# Patient Record
Sex: FEMALE | Race: WHITE
[De-identification: names, ages, dates, MRNs, and addresses within clinical notes are randomized per-mention and may not be internally consistent; named-entity substitution may affect disease eponyms.]

---

## 2017-07-19 ENCOUNTER — HOSPITAL ENCOUNTER (INPATIENT)
Dept: HOSPITAL 36 - ER | Age: 47
LOS: 7 days | Discharge: HOME | DRG: 301 | End: 2017-07-26
Attending: INTERNAL MEDICINE | Admitting: INTERNAL MEDICINE
Payer: MEDICAID

## 2017-07-19 DIAGNOSIS — Z82.49: ICD-10-CM

## 2017-07-19 DIAGNOSIS — F17.210: ICD-10-CM

## 2017-07-19 DIAGNOSIS — N39.0: ICD-10-CM

## 2017-07-19 DIAGNOSIS — G62.9: ICD-10-CM

## 2017-07-19 DIAGNOSIS — S72.142A: Primary | ICD-10-CM

## 2017-07-19 DIAGNOSIS — B19.20: ICD-10-CM

## 2017-07-19 DIAGNOSIS — J44.9: ICD-10-CM

## 2017-07-19 DIAGNOSIS — D72.829: ICD-10-CM

## 2017-07-19 DIAGNOSIS — D64.9: ICD-10-CM

## 2017-07-19 DIAGNOSIS — F31.9: ICD-10-CM

## 2017-07-19 DIAGNOSIS — E44.1: ICD-10-CM

## 2017-07-19 LAB
AMORPH SED URNS QL MICRO: (no result)
AMPHET UR-MCNC: NEGATIVE NG/ML
ANION GAP SERPL CALC-SCNC: 8.5 MMOL/L (ref 7–16)
BACTERIA #/AREA URNS HPF: (no result) /HPF
BARBITURATES UR-MCNC: NEGATIVE UG/ML
BASOPHILS NFR BLD AUTO: 1.1 % (ref 0–2)
BILIRUB UR-MCNC: (no result) MG/DL
BNP SERPL-MCNC: 9.8 PG/ML (ref 5–100)
BUN SERPL-MCNC: 12 MG/DL (ref 7–25)
BUN/CREAT SERPL: 10.9
CALCIUM SERPL-MCNC: 10.4 MG/DL (ref 8.6–10.3)
CHLORIDE SERPL-SCNC: 105 MEQ/L (ref 98–107)
CHOLEST SERPL-MCNC: 161 MG/DL (ref ?–200)
CO2 SERPL-SCNC: 24.8 MEQ/L (ref 21–31)
COLOR UR: (no result)
CREAT SERPL-MCNC: 1.1 MG/DL (ref 0.6–1.2)
EOSINOPHIL NFR BLD AUTO: 0.3 % (ref 0–5)
EPI CELLS URNS QL MICRO: (no result) /LPF
ERYTHROCYTE [DISTWIDTH] IN BLOOD BY AUTOMATED COUNT: 13.1 % (ref 11.5–20)
GLUCOSE SERPL-MCNC: 117 MG/DL (ref 70–105)
GLUCOSE UR STRIP-MCNC: NEGATIVE MG/DL
HCT VFR BLD CALC: 39.5 % (ref 35–45)
HGB BLD-MCNC: 13.4 GM/DL (ref 11.7–15.5)
INR PPP: 0.91 (ref 0.5–1.4)
KETONES UR STRIP-MCNC: NEGATIVE MG/DL
LYMPHOCYTES NFR BLD AUTO: 12.5 % (ref 20–50)
MCH RBC QN AUTO: 29.1 PG (ref 27–31)
MCHC RBC AUTO-ENTMCNC: 33.8 PG (ref 28–36)
MCV RBC AUTO: 86 FL (ref 81–100)
METHADONE UR CFM-MCNC: POSITIVE NG/ML
MONOCYTES NFR BLD AUTO: 6.9 % (ref 2–10)
NEUTROPHILS # BLD: 10.8 TH/CMM (ref 1.8–8)
NEUTROPHILS NFR BLD AUTO: 79.2 % (ref 40–80)
PH UR STRIP: >=9 [PH]
PLATELET # BLD: 262 TH/CMM (ref 150–400)
PMV BLD AUTO: 8.1 FL
POTASSIUM SERPL-SCNC: 4.3 MEQ/L (ref 3.5–5.1)
PROT UR STRIP-MCNC: >300 MG/DL
PROTHROMBIN TIME: 9.5 SECONDS (ref 9.5–11.5)
RBC # BLD AUTO: 4.59 MIL/CMM (ref 3.8–5.1)
RBC # UR STRIP: NEGATIVE /UL
RBC #/AREA URNS HPF: (no result) /HPF (ref 0–5)
SODIUM SERPL-SCNC: 134 MEQ/L (ref 136–145)
TRI-PHOS CRY #/AREA URNS HPF: (no result) /HPF
TRIGL SERPL-MCNC: 166 MG/DL (ref ?–150)
TROPONIN I SERPL-MCNC: < 0.01 NG/ML (ref 0.01–0.05)
UROBILINOGEN UR STRIP-ACNC: 4 E.U./DL (ref 0.2–1)
WBC # BLD AUTO: 13.5 TH/CMM (ref 4.8–10.8)
WBC #/AREA URNS HPF: (no result) /HPF (ref 0–5)

## 2017-07-19 PROCEDURE — X6206: HCPCS

## 2017-07-19 PROCEDURE — Z7610: HCPCS

## 2017-07-19 PROCEDURE — A4216 STERILE WATER/SALINE, 10 ML: HCPCS

## 2017-07-19 PROCEDURE — X3904: HCPCS

## 2017-07-19 RX ADMIN — MORPHINE SULFATE PRN MG: 2 INJECTION, SOLUTION INTRAMUSCULAR; INTRAVENOUS at 21:30

## 2017-07-19 NOTE — DIAGNOSTIC IMAGING REPORT
Exam: Left hip joint.



: HISTORY: Status post fall



Findings:



Portable examination of the left hip joint at 1434 hours was reviewed.



The study demonstrates a intratrochanteric fracture of the left femur

with mild overriding of the shaft of left femur. The head of left femur

is well left acetabulum. The visualized hemipelvis is intact.



IMPRESSION: Minimally displaced intratrochanteric fracture left hip

joint

## 2017-07-19 NOTE — DIAGNOSTIC IMAGING REPORT
Exam: Portable examination of pelvis.



HISTORY: Status post fall



Findings:



Portable examination of the pelvis of the 14th 34 hours was reviewed,

the study demonstrates minimally displaced intratrochanteric fracture of

the left hip joint. The head of left femur is well within the acetabular

fossa. The pelvis is intact. The right hip joint is normal.



IMPRESSION:



Minimally displaced intratrochanteric fracture left hip joint.

## 2017-07-19 NOTE — ED PHYSICIAN CHART
Chief Complaint/HPI





- Patient Information


Date Seen:: 17


Time Seen:: 13:15


Chief Complaint:: Left Hip Pain


History of Present Illness:: 





onset x 4 days of intermittent left hip pain; pt denies any trauma; last 

tetanus shot: <5 years; UTD; pt denies LOC, ALOC, AMS, H/As, neck pain, C/P, SOB

, Abd. pain, cough, A/N/V/D/c, fever, chills, or urinary s/s


Allergies:: 


 Allergies











Allergy/AdvReac Type Severity Reaction Status Date / Time


 


No Known Allergies Allergy   Verified 17 13:12











Vitals:: 


 Vital Signs - 8 hr











  17





  13:13


 


Temp 98.9 F


 





 


RR 16


 


/81


 


O2 Sat % 97











Historian:: Patient, Family Member


Review:: Nurse's Note Reviewed





Review of Systems





- Review of Systems


General/Constitutional: Fever, Chills, No weight loss, No weakness, No 

diaphoresis, No edema, No loss of appetite


Skin: No skin lesions, Rash, No bruising


Head: No headache, No light-headedness


Eyes: No loss of vision, No pain, No diplopia


ENT: No earache, No nasal drainage, No sore throat, No tinnitus


Neck: No neck pain, No swelling, No thyromegaly, No stiffness, No mass noted


Cardio Vascular: No chest pain, No palpitations, No PND, No orthopnea, No edema


Pulmonary: No SOB, No cough, No sputum, No wheezing


GI: Nausea, Vomiting, Diarrhea, No pain, No melena, No hematochezia, 

Constipation, No hematemesis


G/U: No dysuria, No frequency, No hematuria


Ob/Gyn: No vaginal discharge, No abnormal vaginal bleed, No contraction


Musculoskeletal: Bone or joint pain, No back pain, No muscle pain


Endocrine: No polyuria, No polydipsia


Psychiatric: Prior psych history, Depression, Anxiety, No suicidal ideation, No 

homicidal ideation, No auditory hallucination, No visual hallucination


Hematopoietic: No bruising, No lymphadenopathy


Allergic/Immuno: No urticaria, No angioedema


Neurological: No syncope, No focal symptoms, No weakness, No paresthesia, No 

headache, No seizure, No dizziness, No confusion, No vertigo





Past Medical History





- Past Medical History


Obtainable: Yes


Past Medical History: Asthma/COPD, Other (PNA)


Family History: HTN


Social History: Smoker, No Alcohol, No Drug Use, Single


Surgical History: 


Psychiatricy History: Bipolar


Medication: Reviewed





Family Medical History





- Family Member


  ** Mother


Hx Family Cancer: No


Hx Family Congestive Heart Failure: No


Hx Family Hypertension: No


Hx Family Diabetes: No


Hx Family Seizures: No


Hx Family Dementia: No


Hx Family AIDS: No





Physical Exam





- Physical Examination


General/Constitutional: Awake, Well-developed, well-nourished, Alert, No 

distress, GCS 15, Non-toxic appearing, Ambulatory


Head: Atraumatic


Eyes: Lids, conjuctiva normal, PERRL, EOMI


Skin: Nl inspection, No rash, No skin lesions, No ecchymosis, Well hydrated, No 

lymphadenopathy


ENMT: External ears, nose nl, Nasal exam nl, Lips, teeth, gums nl


Neck: Nontender, Full ROM w/o pain, No JVD, No nuchal rigidity, No bruit, No 

mass, No stridor


Respiratory: Nl effort/Exclusion, Clear to Auscultation, No Wheeze/Rhonchi/Rales


Cardio Vascular: RRR, No murmur, gallop, rubs, NL S1 S2


GI: No tenderness/rebounding/guarding, No organomegaly, No hernia, Normal BS's, 

Nondistended, No mass/bruits, No McBurney tenderness


: No CVA tenderness


Extremities: normal strength in all extremities, No edema, Normal digits & nails


Other Extremities comments:: 





Left Hip: + tenderness and deformity with limited ROMs; good NV functions


Neuro/Psych: Alert/oriented, DTR's symmetric, Normal sensory exam, Normal motor 

strength, Judgement/insight normal, Mood normal, Normal gait, No focal deficits


Misc: normal gait, Normal back, No paraspinal tenderness





Labs/Radiology/EKG Results





- Lab Results


Comments:: 





U/A: + Nitrate; + Leukocytes; + Bacteria





- Radiology Results


Results: 





+ Mildly displaced Intertrochanteric Fracture of Left Hip





ED Septic Shock





- .


Is Septic Shock (SBP<90, OR Lactate>4 mmol\L) present?: No





- <6hrs of presentation:


Vital Signs: 


 Vital Signs - 8 hr











  17





  13:13


 


Temp 98.9 F


 





 


RR 16


 


/81


 


O2 Sat % 97














Reassessment (Disposition)





- Reassessment


Reassessment Condition:: Improved





- Diagnosis


Diagnosis:: 





Left Hip Fracture; Left Hip Intractable Pain; UTI





- Aftercare/Follow up Instructions


Aftercare/Follow-Up Instructions:: Counseled pt regarding lab results/diagnosis 

& need follow up, Counseled pt & family regarding lab results/diagnosis & need 

follow up





- Patient Disposition


Discharge/Transfer:: Acute Care w/in this hosp


Accepting Physician:: Dr. Ruth


Time Called:: 7640


Time Responded:: 16:40


Admitted to:: Med/Surg


Spoke to:: Dr. Ruth


Admitting Medical Physician:: Dr. Ruth


Condition at Disposition:: Stable, Improved

## 2017-07-20 VITALS — SYSTOLIC BLOOD PRESSURE: 138 MMHG | DIASTOLIC BLOOD PRESSURE: 71 MMHG

## 2017-07-20 LAB
ANION GAP SERPL CALC-SCNC: 7.8 MMOL/L (ref 7–16)
BASOPHILS NFR BLD AUTO: 1.3 % (ref 0–2)
BUN SERPL-MCNC: 15 MG/DL (ref 7–25)
BUN/CREAT SERPL: 16.7
CALCIUM SERPL-MCNC: 9.7 MG/DL (ref 8.6–10.3)
CHLORIDE SERPL-SCNC: 107 MEQ/L (ref 98–107)
CO2 SERPL-SCNC: 23 MEQ/L (ref 21–31)
CREAT SERPL-MCNC: 0.9 MG/DL (ref 0.6–1.2)
EOSINOPHIL NFR BLD AUTO: 0.3 % (ref 0–5)
ERYTHROCYTE [DISTWIDTH] IN BLOOD BY AUTOMATED COUNT: 13.4 % (ref 11.5–20)
GLUCOSE SERPL-MCNC: 180 MG/DL (ref 70–105)
HCT VFR BLD CALC: 36.3 % (ref 35–45)
HGB BLD-MCNC: 12.2 GM/DL (ref 11.7–15.5)
HGB BLD-MCNC: 13 G/DL (ref 4–35)
INR PPP: 0.9 (ref 0.5–1.4)
LYMPHOCYTES NFR BLD AUTO: 19.5 % (ref 20–50)
MAGNESIUM SERPL-MCNC: 2.1 MG/DL (ref 1.9–2.7)
MCH RBC QN AUTO: 29.2 PG (ref 27–31)
MCHC RBC AUTO-ENTMCNC: 33.7 PG (ref 28–36)
MCV RBC AUTO: 86.7 FL (ref 81–100)
MONOCYTES NFR BLD AUTO: 7.4 % (ref 2–10)
NEUTROPHILS # BLD: 7.2 TH/CMM (ref 1.8–8)
NEUTROPHILS NFR BLD AUTO: 71.5 % (ref 40–80)
PLATELET # BLD: 243 TH/CMM (ref 150–400)
PMV BLD AUTO: 8.5 FL
POTASSIUM SERPL-SCNC: 3.8 MEQ/L (ref 3.5–5.1)
PROTHROMBIN TIME: 9.3 SECONDS (ref 9.5–11.5)
RBC # BLD AUTO: 4.19 MIL/CMM (ref 3.8–5.1)
SODIUM SERPL-SCNC: 134 MEQ/L (ref 136–145)
WBC # BLD AUTO: 9.9 TH/CMM (ref 4.8–10.8)

## 2017-07-20 RX ADMIN — MORPHINE SULFATE PRN MG: 2 INJECTION, SOLUTION INTRAMUSCULAR; INTRAVENOUS at 06:50

## 2017-07-20 RX ADMIN — ENOXAPARIN SODIUM SCH MG: 100 INJECTION SUBCUTANEOUS at 17:16

## 2017-07-20 RX ADMIN — MORPHINE SULFATE PRN MG: 2 INJECTION, SOLUTION INTRAMUSCULAR; INTRAVENOUS at 01:40

## 2017-07-20 RX ADMIN — MORPHINE SULFATE PRN MG: 2 INJECTION, SOLUTION INTRAMUSCULAR; INTRAVENOUS at 18:47

## 2017-07-20 RX ADMIN — ENOXAPARIN SODIUM SCH MG: 100 INJECTION SUBCUTANEOUS at 01:21

## 2017-07-20 RX ADMIN — ENOXAPARIN SODIUM SCH MG: 100 INJECTION SUBCUTANEOUS at 09:00

## 2017-07-20 RX ADMIN — FLUCONAZOLE SCH MLS/HR: 2 INJECTION, SOLUTION INTRAVENOUS at 12:24

## 2017-07-20 RX ADMIN — MORPHINE SULFATE PRN MG: 2 INJECTION, SOLUTION INTRAMUSCULAR; INTRAVENOUS at 13:14

## 2017-07-20 NOTE — ADMIT CRITERIA FORM
Admit Criteria Forms





- Admit Criteria


Diagnosis: 








               MUSCULOSKELETAL DISEASE [facility]





Clinical Indications for Admission to Inpatient Care





                                                            (Place 'X' for any 

and all applicable criteria):





Hospital admission is needed for appropriate care of the patient because of 1 

or more of the following:





[X ]I.    Fracture, dislocation, or other musculoskeletal injury requiring 

inpatient care(medical) as indicated 


         by 1 or more of the following(4)(5)(6)(7)


         [ ]a)  Vertebral fracture requiring observation for instability or 

neurologic compromise (8)


         [ ]b)  Compartment syndrome (proven or cannot be ruled out during 

observation level of care) (9)


         [ ]c)  Limb-threatening injury


         [ ]d)  Major injury requiring inpatient stabilization such as traction 

initiation or external fixation 


                 before internal fixation or closure of complex or open fracture


         [ X]e)  Major injury requiring inpatient treatment after emergency or 

observation level care (as appropriate)


         [X ]f)   Severe pain requiring acute inpatient management


         [ ]g)  Injury with suspicion of abuse or neglect (eg., child, 

dependent elderly)





[ ]II.    Newly diagnosed or suspected bone, joint, or orthopedic device 

infection (e.g., osteomyelitis, septic arthritis) 


          needing 1 or more of the following(1)(2)(3)


          [ ]a)   IV antibiotics that cannot be initiated in other than 

inpatient setting (e.g., patient too unstable or


                   home infusion not available)


          [ ]b)   Device removal or replacement


          [ ]c)   Bone or soft tissue debridement


          [ ]d)   Joint drainage (drain placement or repetitive aspirations)


[ ]III.    Severe rheumatologic disease (e.g., systemic lupus erythematosus, 

rheumatoid arthritis) with complications


          or comorbidities (Also use Optimal Recovery Care Criteria or General 

Recovery Criteria as appropriate on the 


          basis of predominant condition), including 1 or more of the following(

10)(11)(12)(13)


          [ ]a)  Severe infection (e.g., CNS infection, sepsis) (14)


          [ ]b)  Respiratory complications, including 1 or more of the following

:


                  [ ]i)     Pleural effusion with respiratory compromise      


                  [ ]ii)    Pulmonary hypertension with congestive failure 


                  [ ]iii)   Respiratory failure


                  [ ]iv)   Pulmonary hemorrhage (15)


           [ ]c) Hematologic disease, including 1 or more of the following:


                 [ ]i)     Coagulopathy with bleeding        


                 [ ]ii)    Thrombosis with hypercoagulable state      


                 [ ]iii)   Thrombotic thrombocytopenic purpura 


           [ ]d) Cerebritis with seizures, psychosis, or other severe 

abnormalities


           [ ]e) Vertebral destruction with monitoring needed for cervical 

myelopathy& possible respiratory compromise


           [ ]f)  Exacerbation that requires inpatient treatment (e.g., 

intravenous immunosuppression) (16)


           [ ]g) Acute renal failure


           [ ]h) Cerebritis with seizures, psychosis, Altered mental status, or 

other neurologic abnormalities


           [ ]i) Pericardial effusion with tamponade


           [ ]j) Vertebral destruction, with monitoring needed for cervical 

myelopathy and possible respiratory compromise





[ ]IV. Severe vasculitis with complications or comorbidities (Also use Optimal 

Recovery Care Criteria 


        General Recovery Criteria as appropriate on the basis of predominant 

condition), including


        1 or more of the following(11)(12)(17)(18)(19)(20)


        [ ]a)   Exacerbation that requires inpatient treatment (e.g., 

intravenous immunosuppression) (19)(21)


        [ ]b)    Pulmonary hemorrhage (15)                                     

                                  


        [ ]c)   CNS vasculitis with seizures, psychosis, Altered mental status 

that is severe or persistent, or other severe abnormalities (22)


        [ ]d)  Cerebral infarction                                             


        [ ]e)  Gastrointestinal ischemia 


        [ ]f)   Gangrene or threatened amputation


        [ ]g)  Renal failure (16)   


        [ ]h) Other significant complications of vasculitis ( eg., tissue or 

organ ischemia, organ dysfunction )





[ ]V.  Severe myopathy as indicated by 1 or more of the following (28)(29)


        [ ]a)  New onset of airway compromise or inability to swallow


        [ ]b)  Respiratory deterioration with observation needed for impending 

respiratory failure


        [ ]c)  Exacerbation that requires inpatient treatment (e.g., 

intravenous immunosuppression) 





[ ]VI. Severe crystal gout (arthropathy) indicated by 1 or more of the 

following (23)(24)


        [ ]a)  Severe pain requiring acute inpatient management


        [ ]b)  Exacerbation that requires inpatient treatment (e.g., 

intravenous treatment) 





[ ]VII.Rhabdomyolysis and 1 or more of the following (25)(26)(27)


        [ ]a)  Acute renal failure


        [ ]b)  Need for intravenous hydration after emergency or observation 

level care (as appropriate)


        [ ]c)  Inability to maintain oral hydration


        [ ]d)  Change in mental status


        [ ]e)  Electrolyte abnormality that remains after emergency or 

observation level care (as appropriate) 





[ ]VIII Post amputation complication, as indicated by ANY ONE of the following 


         [ ]a) Infection


         [ ]b) Dehiscence


         [ ]c) Myodesis failure        





[ ]IX. Severe pain requiring acute inpatient management due to musculoskeletal 

condition


       


[ ]X.  Musculoskeletal Disease and ALL of the following:


        [ ]a)  Symptom or finding for which emergency and observation care have 

failed or are not considered 


                appropriate (Use General Criteria: Observation Care as 

appropriate)


        [ ]b)  Presence of ANY ONE of the following


               [ ]i)   A General Admission Criteria    


               [ ]ii)  A Pediatric General Admission Criteria 











The original [facility] content created by [facility] has been revised. 


The portions of the content which have been revised are identified through the 

use of italic text or in bold, and [facility] 


has neither reviewed nor approved the modified material. All other unmodified 

content is copyright  [facility].





Please see references footnoted in the original [facility] edition 

2016


Admit Criteria Met?: Yes

## 2017-07-20 NOTE — DIAGNOSTIC IMAGING REPORT
Portable chest x-ray

Time: 1633 hours



History: None



Allowing for portable technique the heart size is normal. No focal

pulmonary parenchymal processes. No hilar or mediastinal abnormalities.



Impression: No acute abnormalities.

## 2017-07-21 LAB
ANION GAP SERPL CALC-SCNC: 7.2 MMOL/L (ref 7–16)
BUN SERPL-MCNC: 7 MG/DL (ref 7–25)
BUN/CREAT SERPL: 10
CALCIUM SERPL-MCNC: 9.3 MG/DL (ref 8.6–10.3)
CHLORIDE SERPL-SCNC: 111 MEQ/L (ref 98–107)
CO2 SERPL-SCNC: 23.8 MEQ/L (ref 21–31)
CREAT SERPL-MCNC: 0.7 MG/DL (ref 0.6–1.2)
EOSINOPHIL NFR BLD: 2 % (ref 0–5)
ERYTHROCYTE [DISTWIDTH] IN BLOOD BY AUTOMATED COUNT: 13.5 % (ref 11.5–20)
GLUCOSE SERPL-MCNC: 104 MG/DL (ref 70–105)
HCT VFR BLD CALC: 37 % (ref 35–45)
HGB BLD-MCNC: 12.6 GM/DL (ref 11.7–15.5)
MAGNESIUM SERPL-MCNC: 2.1 MG/DL (ref 1.9–2.7)
MCH RBC QN AUTO: 29.2 PG (ref 27–31)
MCHC RBC AUTO-ENTMCNC: 34 PG (ref 28–36)
MCV RBC AUTO: 86 FL (ref 81–100)
NEUTROPHILS NFR BLD AUTO: 66 % (ref 40–80)
PLAT MORPH BLD: NORMAL
PLATELET # BLD EST: ADEQUATE 10*3/UL
PLATELET # BLD: 230 TH/CMM (ref 150–400)
PMV BLD AUTO: 8.1 FL
POTASSIUM SERPL-SCNC: 4 MEQ/L (ref 3.5–5.1)
RBC # BLD AUTO: 4.3 MIL/CMM (ref 3.8–5.1)
RBC MORPH BLD: NORMAL
SODIUM SERPL-SCNC: 138 MEQ/L (ref 136–145)
TOTAL CELLS COUNTED BLD: 100
WBC # BLD AUTO: 10.5 TH/CMM (ref 4.8–10.8)

## 2017-07-21 PROCEDURE — 0SRS0JZ REPLACEMENT OF LEFT HIP JOINT, FEMORAL SURFACE WITH SYNTHETIC SUBSTITUTE, OPEN APPROACH: ICD-10-PCS

## 2017-07-21 RX ADMIN — MORPHINE SULFATE PRN MG: 2 INJECTION, SOLUTION INTRAMUSCULAR; INTRAVENOUS at 10:30

## 2017-07-21 RX ADMIN — MORPHINE SULFATE PRN MG: 2 INJECTION, SOLUTION INTRAMUSCULAR; INTRAVENOUS at 06:13

## 2017-07-21 RX ADMIN — ENOXAPARIN SODIUM SCH MG: 100 INJECTION SUBCUTANEOUS at 09:15

## 2017-07-21 RX ADMIN — FLUCONAZOLE SCH MLS/HR: 2 INJECTION, SOLUTION INTRAVENOUS at 09:16

## 2017-07-21 RX ADMIN — ENOXAPARIN SODIUM SCH: 100 INJECTION SUBCUTANEOUS at 21:11

## 2017-07-21 NOTE — CONSULTATION
DATE OF CONSULTATION:  07/20/2017



HISTORY:  The patient is a 47-year-old lady admitted to [facility] on 07/19/2017 because of a fracture of the left hip.  I was called in

orthopedic consultation by the admitting physician regarding this fracture.



PAST HISTORY:  The patient is vociferous and cussing and not making any sense at

all.  I tried to take a history and find out about her fracture and I got

absolutely nowhere.



Information obtained from the chart indicates that she is a tobacco user.  She

was admitted from her home rather than a facility of any sort.  There is a

history of substance abuse.  She also has a history of asthma, COPD, and bipolar

disorder.



PHYSICAL EXAMINATION:  The patient was examined in her hospital room at [facility].  She was lying on her back on the bed with her left leg

hanging over the side.  I observed some swelling about the left hip, but no

bruising.  Any attempt at movement, active or passive brought about even louder

yelling and cussing, i.e. apparently caused her pain.  Her ankles "dry" with

no edema.  Peripheral pulses are thready.  Skin color and temperature is normal.

 I observed appropriate movement of all other limbs.



She was totally uncooperative when I was trying to examine her, and just

continued to cuss and yell even more loudly.



IMAGING STUDIES:  I did x-rays in the PACS _____ fracture in the femoral neck,

which is displaced.



ORTHOPEDIC DIAGNOSIS:  Fracture left femoral neck, closed, displaced.



RECOMMENDATIONS:  When the patient is medically cleared and optimized she can

have surgery _____ arthroplasty left hip, following which she can be ambulated,

partial weightbearing, and when stable and safe in a couple of days will be

transferred to a SNF.



Thank you for this interesting referral.





DD: 07/20/2017 23:12

DT: 07/21/2017 02:29

JOB# [id number]  [id number]

## 2017-07-21 NOTE — CARDIOLOGY
Cardiology Report





- Cardiology


Cardiology: 


Date of Service: 07/20/2017 Dr. Ruth








M-MODE ECHOCARDIOLGRAM: Mitral valve normal left ventricular posterior wall 

hypertrophy ejection fraction 60% left antrum normal aortic root is normal 

aortic leaflets normal








CONCLUSION: Hypertrophy left ventricle ejection fraction 60%








2D ECHO:


Long axis hypertrophy left ventricle mitral normal left atrium normal aortic 

root normal aortic leaflets normal


Short axis mitral valve and aortic valve normal


Apical 4 chamber normal-sized left ventricle with hypertrophy of the left 

ventricle ejection fraction 60% left Atrium normal right ventricular cavity 

domal right atrium normal





CONCLUSION:


Hypertrophy left ventricle ejection fraction 60%





DOPPLER: Prominent A wave normal antegrade flow mitral valve tricuspid valve 

the aortic  pulmonary valve








CONCLUSION:


07/20/2017

## 2017-07-22 LAB
ALBUMIN/GLOB SERPL: 1 {RATIO} (ref 1–1.8)
ALP SERPL-CCNC: 89 U/L (ref 34–104)
ALT SERPL-CCNC: 19 U/L (ref 7–52)
ANION GAP SERPL CALC-SCNC: 7.7 MMOL/L (ref 7–16)
AST SERPL-CCNC: 28 U/L (ref 13–39)
BASOPHILS NFR BLD AUTO: 0.4 % (ref 0–2)
BILIRUB SERPL-MCNC: 0.2 MG/DL (ref 0.3–1)
BUN SERPL-MCNC: 4 MG/DL (ref 7–25)
BUN/CREAT SERPL: 5.7
CALCIUM SERPL-MCNC: 8.5 MG/DL (ref 8.6–10.3)
CHLORIDE SERPL-SCNC: 109 MEQ/L (ref 98–107)
CO2 SERPL-SCNC: 21.7 MEQ/L (ref 21–31)
CREAT SERPL-MCNC: 0.7 MG/DL (ref 0.6–1.2)
EOSINOPHIL NFR BLD AUTO: 0.4 % (ref 0–5)
ERYTHROCYTE [DISTWIDTH] IN BLOOD BY AUTOMATED COUNT: 13.3 % (ref 11.5–20)
GLOBULIN SER-MCNC: 2.9 GM/DL
GLUCOSE SERPL-MCNC: 205 MG/DL (ref 70–105)
HCT VFR BLD CALC: 27.6 % (ref 35–45)
HGB BLD-MCNC: 9.4 GM/DL (ref 11.7–15.5)
LYMPHOCYTES NFR BLD AUTO: 15 % (ref 20–50)
MCH RBC QN AUTO: 29.5 PG (ref 27–31)
MCHC RBC AUTO-ENTMCNC: 34 PG (ref 28–36)
MCV RBC AUTO: 86.8 FL (ref 81–100)
MONOCYTES NFR BLD AUTO: 9.4 % (ref 2–10)
NEUTROPHILS # BLD: 8.8 TH/CMM (ref 1.8–8)
NEUTROPHILS NFR BLD AUTO: 74.8 % (ref 40–80)
PLATELET # BLD: 215 TH/CMM (ref 150–400)
PMV BLD AUTO: 8.2 FL
POTASSIUM SERPL-SCNC: 3.4 MEQ/L (ref 3.5–5.1)
RBC # BLD AUTO: 3.18 MIL/CMM (ref 3.8–5.1)
SODIUM SERPL-SCNC: 135 MEQ/L (ref 136–145)
WBC # BLD AUTO: 11.7 TH/CMM (ref 4.8–10.8)

## 2017-07-22 RX ADMIN — MORPHINE SULFATE PRN MG: 2 INJECTION, SOLUTION INTRAMUSCULAR; INTRAVENOUS at 18:25

## 2017-07-22 RX ADMIN — DEXTROSE AND SODIUM CHLORIDE SCH MLS/HR: 5; .45 INJECTION, SOLUTION INTRAVENOUS at 04:24

## 2017-07-22 RX ADMIN — MORPHINE SULFATE PRN MG: 2 INJECTION, SOLUTION INTRAMUSCULAR; INTRAVENOUS at 01:02

## 2017-07-22 RX ADMIN — NICOTINE SCH: 21 PATCH TRANSDERMAL at 01:01

## 2017-07-22 RX ADMIN — FLUCONAZOLE SCH MLS/HR: 2 INJECTION, SOLUTION INTRAVENOUS at 08:54

## 2017-07-22 RX ADMIN — ENOXAPARIN SODIUM SCH: 100 INJECTION SUBCUTANEOUS at 18:15

## 2017-07-22 RX ADMIN — DEXTROSE AND SODIUM CHLORIDE SCH MLS/HR: 5; .45 INJECTION, SOLUTION INTRAVENOUS at 22:06

## 2017-07-22 RX ADMIN — ENOXAPARIN SODIUM SCH: 100 INJECTION SUBCUTANEOUS at 08:51

## 2017-07-22 RX ADMIN — NICOTINE SCH: 21 PATCH TRANSDERMAL at 08:52

## 2017-07-22 NOTE — GENERAL PROGRESS NOTE
Subjective





- Review of Systems


Service Date: 17


Subjective: 





Modest pain left  hip - surgery yesterday  She goes outside and smokes.





Objective





- Results


Result Diagrams: 


 17 08:50





 17 08:50


Recent Labs: 


 Laboratory Last Values











WBC  11.7 Th/cmm (4.8-10.8)  H  17  08:50    


 


RBC  3.18 Mil/cmm (3.80-5.10)  L  17  08:50    


 


Hgb  9.4 gm/dL (11.7-15.5)  L D 17  08:50    


 


Hct  27.6 % (35.0-45.0)  L D 17  08:50    


 


MCV  86.8 fl ()   17  08:50    


 


MCH  29.5 pg (27.0-31.0)   17  08:50    


 


MCHC Differential  34.0 pg (28.0-36.0)   17  08:50    


 


RDW  13.3 % (11.5-20.0)   17  08:50    


 


Plt Count  215 Th/cmm (150-400)   17  08:50    


 


MPV  8.2 fl  17  08:50    


 


Neutrophils %  74.8 % (40.0-80.0)   17  08:50    


 


Lymphocytes %  15.0 % (20.0-50.0)  L  17  08:50    


 


Monocytes %  9.4 % (2.0-10.0)   17  08:50    


 


Eosinophils %  0.4 % (0.0-5.0)   17  08:50    


 


Basophils %  0.4 % (0.0-2.0)   17  08:50    


 


Neutrophils (Manual)  66 % (40-80)   17  10:59    


 


Lymphocytes  26 % (20-50)   17  10:59    


 


Monocytes  6 % (2-10)   17  10:59    


 


Eosinophils  2 % (0-5)   17  10:59    


 


Platelet Estimate  ADEQUATE  (NORMAL)   17  10:59    


 


Platelet Morphology  NORMAL  (NORMAL)   17  10:59    


 


RBC Morph Micro Appear  NORMAL  (NORMAL)   17  10:59    


 


PT  9.3 SECONDS (9.5-11.5)  L  17  05:57    


 


INR  0.90  (0.5-1.4)   17  05:57    


 


Sodium  135 mEq/L (136-145)  L  17  08:50    


 


Potassium  3.4 mEq/L (3.5-5.1)  L  17  08:50    


 


Chloride  109 mEq/L ()  H  17  08:50    


 


Carbon Dioxide  21.7 mEq/L (21.0-31.0)   17  08:50    


 


Anion Gap  7.7  (7.0-16.0)   17  08:50    


 


BUN  4 mg/dL (7-25)  L  17  08:50    


 


Creatinine  0.7 mg/dL (0.6-1.2)   17  08:50    


 


Est GFR ( Amer)  > 60.0 ml/min (>90)   17  08:50    


 


Est GFR (Non-Af Amer)  > 60.0 ml/min  17  08:50    


 


BUN/Creatinine Ratio  5.7   17  08:50    


 


Glucose  205 mg/dL ()  H  17  08:50    


 


Hemoglobin A1c %  5.4 % (4.0-6.0)   17  05:57    


 


Whole Bld Lactic Acid  1.67 mmol/L (0.60-1.99)   17  16:20    


 


Calcium  8.5 mg/dL (8.6-10.3)  L  17  08:50    


 


Magnesium  2.1 mg/dL (1.9-2.7)   17  11:43    


 


Total Bilirubin  0.2 mg/dL (0.3-1.0)  L  17  08:50    


 


AST  28 U/L (13-39)   17  08:50    


 


ALT  19 U/L (7-52)   17  08:50    


 


Alkaline Phosphatase  89 U/L ()   17  08:50    


 


Creatine Kinase  56 U/L ()   17  16:20    


 


Troponin I  < 0.01 ng/mL (0.01-0.05)  L  17  16:20    


 


B-Natriuretic Peptide  9.8 pg/mL (5.0-100.0)   17  16:20    


 


Total Protein  5.8 gm/dL (6.0-8.3)  L  17  08:50    


 


Albumin  2.9 gm/dL (3.7-5.3)  L  17  08:50    


 


Globulin  2.9 gm/dL  17  08:50    


 


Albumin/Globulin Ratio  1.0  (1.0-1.8)   17  08:50    


 


Triglycerides  166 mg/dL (<150)  H  17  16:20    


 


Cholesterol  161 mg/dL (<200)   17  16:20    


 


LDL Cholesterol Direct  90 mg/dL ()   17  16:20    


 


HDL Cholesterol  50 mg/dL (23-92)   17  16:20    


 


TSH  1.06 uIU/ml (0.34-5.60)   17  05:57    


 


Urine Source  CLEAN C   17  13:30    


 


Urine Color  ORANGE   17  13:30    


 


Urine Clarity  CLOUDY  (CLEAR)  H  17  13:30    


 


Urine pH  >=9.0   17  13:30    


 


Ur Specific Gravity  1.005  (1.005-1.030)   17  13:30    


 


Urine Protein  >300 mg/dL (NEGATIVE)  H  17  13:30    


 


Urine Glucose (UA)  NEGATIVE mg/dL (NEGATIVE)   17  13:30    


 


Urine Ketones  NEGATIVE mg/dL (NEGATIVE)   17  13:30    


 


Urine Blood  NEGATIVE  (NEGATIVE)   17  13:30    


 


Urine Nitrate  POSITIVE  (NEGATIVE)  H  17  13:30    


 


Urine Bilirubin  SMALL  (NEGATIVE)  H  17  13:30    


 


Urine Urobilinogen  4.0 E.U./dL (0.2 - 1.0)  H  17  13:30    


 


Ur Leukocyte Esterase  TRACE  (NEGATIVE)  H  17  13:30    


 


Urine RBC  NONE SEEN /hpf (0-5)   17  13:30    


 


Urine WBC  0-2 /hpf (0-5)   17  13:30    


 


Ur Epithelial Cells  NONE SEEN /lpf (FEW)   17  13:30    


 


Triple Phos Crystals  MANY /hpf (FEW)   17  13:30    


 


Amorphous Sediment  MODERATE PHOSPHATES  (NONE SEEN)   17  13:30    


 


Urine Bacteria  MODERATE /hpf (NONE SEEN)   17  13:30    


 


Urine Yeast  MODERATE /hpf (NONE SEEN)  H  17  13:30    


 


Urine Pregnancy Test  NEGATIVE   17  13:35    


 


Urine Opiates Screen  NEGATIVE  (NEGATIVE)   17  13:30    


 


Urine Methadone Screen  POSITIVE  (NEGATIVE)   17  13:30    


 


Ur Barbiturates Screen  NEGATIVE  (NEGATIVE)   17  13:30    


 


Ur Tricyclics Screen  POSITIVE  (NEGATIVE)  H  17  13:30    


 


Ur Phencyclidine Scrn  NEGATIVE  (NEGATIVE)   17  13:30    


 


Amphetamines Screen  NEGATIVE  (NEGATIVE)   17  13:30    


 


U Methamphetamines Scrn  NEGATIVE  (NEGATIVE)   17  13:30    


 


U Benzodiazepines Scrn  NEGATIVE  (NEGATIVE)   17  13:30    


 


U Cocaine Metab Screen  NEGATIVE  (NEGATIVE)   17  13:30    


 


U Cannabinoids Screen  NEGATIVE  (NEGATIVE)   17  13:30    














- Physical Exam


Vitals and I&O: 


 Vital Signs











Temp  98.0 F   17 12:07


 


Pulse  107   17 12:07


 


Resp  20   17 12:07


 


BP  130/79   17 12:07


 


Pulse Ox  98   17 12:07








 Intake & Output











 17





 18:59 06:59 18:59


 


Intake Total 50 390 100


 


Output Total  1120 450


 


Balance 50 -730 -350


 


Weight (lbs)  84.232 kg 83.915 kg


 


Intake:   


 


  Intake, IV Amount 50 50 


 


    Fluconazole 100mg/50mL 50  





    100 mg In 50 ml @ 50 mls/   





    hr IV Q24HR [facility] Rx#:   





    [id number]   


 


    ceFAZolin 1 gm In Sodium  50 





    Chloride 0.9% 50 ml @ 100   





    mls/hr IV Q8HR [facility] Rx#:   





    [id number]   


 


  Oral  340 100


 


Output:   


 


  Urine  1000 450


 


  Other  120 


 


Other:   


 


  # Bowel Movements  0 











Active Medications: 


Current Medications





Acetaminophen/Hydrocodone Bitart (Norco 5mg/325mg)  1 tab PO Q4H PRN


   PRN Reason: PAIN-MODERATE


   Stop: 17 13:33


Alprazolam (Xanax)  1 mg PO Q8HR [facility]


   PRN Reason: Protocol


   Stop: 17 21:59


   Last Admin: 17 06:21 Dose:  Not Given


Aspirin (Ecotrin)  81 mg PO DAILY [facility]


   Stop: 17 08:59


   Last Admin: 17 08:51 Dose:  81 mg


Carisoprodol (Soma)  350 mg PO BID [facility]


   Stop: 17 08:59


   Last Admin: 17 08:51 Dose:  350 mg


Docusate Sodium (Colace)  100 mg PO DAILY PRN


   PRN Reason: Constipation


   Stop: 17 08:59


Enoxaparin Sodium (Lovenox)  30 mg SUBQ BID [facility]


   Stop: 17 20:59


   Last Admin: 17 08:51 Dose:  Not Given


Gabapentin (Neurontin)  600 mg PO TID [facility]


   Stop: 17 20:59


   Last Admin: 17 08:51 Dose:  600 mg


Fluconazole (Diflucan)  100 mg in 50 mls @ 50 mls/hr IV Q24HR [facility]


   Stop: 17 08:44


   Last Admin: 17 08:54 Dose:  50 mls/hr


Dextrose/Sodium Chloride (D5-0.45ns)  1,000 mls @ 60 mls/hr IV .[id number] [facility]


   Stop: 17 03:25


   Last Admin: 17 04:24 Dose:  60 mls/hr


Ketorolac Tromethamine (Toradol)  30 mg IVP Q6HR PRN


   PRN Reason: Pain (Mild-Moderate)


   Stop: 17 01:08


   Last Admin: 17 02:22 Dose:  30 mg


Magnesium Hydroxide (Milk Of Magnesia)  30 ml PO DAILY PRN


   PRN Reason: If Colace ineffective


   Stop: 17 03:32


Meperidine HCl (Demerol)  12.5 mg IVP UD PRN


   PRN Reason: POST-OP PAIN


   Stop: 17 16:42


Morphine Sulfate (Morphine)  1 mg IVP Q4HR PRN


   PRN Reason: Pain (Moderate)


   Stop: 17 20:48


   Last Admin: 17 01:02 Dose:  1 mg


Morphine Sulfate (Morphine)  1 mg IVP Q2HR PRN


   PRN Reason: Severe Pain


   Stop: 17 03:27


   Last Admin: 17 09:09 Dose:  1 mg


Multivitamins/Vitamin C (Theragran)  1 tab PO DAILY [facility]


   Stop: 17 08:59


   Last Admin: 17 08:51 Dose:  1 tab


Nicotine (Nicotine Transdermal System)  21 mg TD DAILY [facility]


   Stop: 17 20:59


   Last Admin: 17 08:52 Dose:  Not Given


Quetiapine Fumarate (Seroquel)  300 mg PO HS [facility]


   PRN Reason: Protocol


   Stop: 17 20:59


   Last Admin: 17 21:11 Dose:  300 mg


Rivaroxaban (Xarelto)  10 mg PO DAILY [facility]


   Stop: 17 09:01


Ziprasidone (Geodon)  120 mg PO HS [facility]


   Stop: 17 00:14


   Last Admin: 17 21:11 Dose:  120 mg











- Procedures


Procedures: 


 Procedures











Procedure Code Date


 


 DELIVERY ONLY 19436 05


 


INJECT RH IMMUNE GLOBUL 99.11 05


 


LOW CERVICAL  74.1 05

## 2017-07-22 NOTE — OPERATIVE REPORT
DATE OF SURGERY:  07/21/2017



PREOPERATIVE DIAGNOSIS:  Femoral neck fracture, left hip, closed.



POSTOPERATIVE DIAGNOSIS:  Femoral neck fracture, left hip, closed.



SURGEON:  Johan Brizuela M.D.



ASSISTANT:  Immanuel Frey M.D.



ANESTHESIOLOGIST:  Diaz Spencer M.D.



ANESTHESIA:  Spinal anesthesia.



PROCEDURE:  Hemiarthroplasty of fracture left hip with Smith and Nephew

components.



DESCRIPTION OF PROCEDURE:  Following satisfactory anesthesia by Dr. Spencer, the

patient was given intravenous antibiotics.  She was placed in the right lateral

decubitus position and held with the vacuum immobilizer.  A sterile plastic U

drape was used to seal off the perineum.  The left hip and lower extremity were

sterilely prepped and draped.  Sterile stockinette was used over the extremity

and sterile Ioban over the operative field.  The routine timeout was performed. 

A posterolateral approach to the hip was made.  The fascia rayne was divided just

below the greater trochanter and dissected proximally and medially dividing the

gluteal fascia and muscle.  The Charnley retractor was then placed.  The

external rotator muscles were released from their attachment at the greater

trochanter and tagged with two #1 Ethibond sutures for later reattachment.  The

oscillating saw was used to divide the neck 1 cm above the lesser trochanter and

then the fractured neck and head were removed.  The head measured at 43 mm

diameter.  The acetabulum was debrided and a 43 mm sizer tried.  The fit was

good.



The proximal end of the femur was brought into the wound.  The box chisel was

used to open the canal and then the canal finder and progressive reamers to size

11 were used.  Broaches to size 10 were used, 10 fit was very tight.  A trial

reduction was carried out with a standard size neck and a 43 mm head.  Fit,

stability and range of motion appeared good.  The trial components were removed

and the implant placed.  The hip was again reduced.  Fit, stability and range of

motion were good.



The wound was irrigated and closed over a 3-1/16-inch Hemovac drain.  The

external rotator muscles were reattached to the greater trochanter via 2 drill

holes in the greater trochanter.  The fascia rayne and gluteal fascia were closed

with a running #1 Vicryl stitch.  The subcutaneous layer was closed with 2-0

Vicryl and skin staples used.  Diluted Exparel was injected in each layer during

closure, total of 80 mL.  Blood loss was estimated at 350 mL.  There was no

replacement.



IMMEDIATE POSTOPERATIVE CONDITION:  Good.





DD: 07/22/2017 14:09

DT: 07/22/2017 18:34

[facility]# [id number]  [id number]

## 2017-07-23 LAB
ANION GAP SERPL CALC-SCNC: 8.8 MMOL/L (ref 7–16)
BASOPHILS NFR BLD AUTO: 0 % (ref 0–2)
BUN SERPL-MCNC: 7 MG/DL (ref 7–25)
BUN/CREAT SERPL: 11.7
CALCIUM SERPL-MCNC: 9 MG/DL (ref 8.6–10.3)
CHLORIDE SERPL-SCNC: 110 MEQ/L (ref 98–107)
CO2 SERPL-SCNC: 21.9 MEQ/L (ref 21–31)
CREAT SERPL-MCNC: 0.6 MG/DL (ref 0.6–1.2)
EOSINOPHIL NFR BLD AUTO: 0.4 % (ref 0–5)
ERYTHROCYTE [DISTWIDTH] IN BLOOD BY AUTOMATED COUNT: 13.2 % (ref 11.5–20)
GLUCOSE SERPL-MCNC: 176 MG/DL (ref 70–105)
HCT VFR BLD CALC: 27.7 % (ref 35–45)
HGB BLD-MCNC: 9.3 GM/DL (ref 11.7–15.5)
LYMPHOCYTES NFR BLD AUTO: 12.2 % (ref 20–50)
MAGNESIUM SERPL-MCNC: 1.9 MG/DL (ref 1.9–2.7)
MCH RBC QN AUTO: 29.5 PG (ref 27–31)
MCHC RBC AUTO-ENTMCNC: 33.4 PG (ref 28–36)
MCV RBC AUTO: 88.3 FL (ref 81–100)
MONOCYTES NFR BLD AUTO: 7.8 % (ref 2–10)
NEUTROPHILS # BLD: 11 TH/CMM (ref 1.8–8)
NEUTROPHILS NFR BLD AUTO: 79.6 % (ref 40–80)
PLATELET # BLD: 237 TH/CMM (ref 150–400)
PMV BLD AUTO: 7.9 FL
POTASSIUM SERPL-SCNC: 3.7 MEQ/L (ref 3.5–5.1)
RBC # BLD AUTO: 3.14 MIL/CMM (ref 3.8–5.1)
SODIUM SERPL-SCNC: 137 MEQ/L (ref 136–145)
WBC # BLD AUTO: 13.9 TH/CMM (ref 4.8–10.8)

## 2017-07-23 RX ADMIN — NICOTINE SCH: 21 PATCH TRANSDERMAL at 08:24

## 2017-07-23 RX ADMIN — ENOXAPARIN SODIUM SCH MG: 100 INJECTION SUBCUTANEOUS at 17:12

## 2017-07-23 RX ADMIN — MORPHINE SULFATE PRN MG: 2 INJECTION, SOLUTION INTRAMUSCULAR; INTRAVENOUS at 08:23

## 2017-07-23 RX ADMIN — FLUCONAZOLE SCH MLS/HR: 2 INJECTION, SOLUTION INTRAVENOUS at 09:06

## 2017-07-23 RX ADMIN — DEXTROSE AND SODIUM CHLORIDE SCH MLS/HR: 5; .45 INJECTION, SOLUTION INTRAVENOUS at 17:12

## 2017-07-23 RX ADMIN — MORPHINE SULFATE PRN MG: 2 INJECTION, SOLUTION INTRAMUSCULAR; INTRAVENOUS at 17:12

## 2017-07-23 RX ADMIN — ENOXAPARIN SODIUM SCH MG: 100 INJECTION SUBCUTANEOUS at 09:00

## 2017-07-23 NOTE — DIAGNOSTIC IMAGING REPORT
Exam: Left hip joint.



Status post the arthroplasty.



Findings:



Portable examination of left hip joint at 1143 was reviewed. The study

demonstrates satisfactory positioning of total left hip prosthesis.

There is no evidence of fracture dislocation, the head of left

prosthesis is well within the acetabulum. Drainage tubes are noted.



IMPRESSION: Satisfactory appearance of the total left hip prosthesis.

## 2017-07-24 RX ADMIN — NICOTINE SCH: 21 PATCH TRANSDERMAL at 09:34

## 2017-07-24 RX ADMIN — MORPHINE SULFATE PRN MG: 2 INJECTION, SOLUTION INTRAMUSCULAR; INTRAVENOUS at 20:56

## 2017-07-24 RX ADMIN — ENOXAPARIN SODIUM SCH MG: 100 INJECTION SUBCUTANEOUS at 09:33

## 2017-07-24 RX ADMIN — MORPHINE SULFATE PRN MG: 2 INJECTION, SOLUTION INTRAMUSCULAR; INTRAVENOUS at 01:06

## 2017-07-24 RX ADMIN — MORPHINE SULFATE PRN MG: 2 INJECTION, SOLUTION INTRAMUSCULAR; INTRAVENOUS at 13:58

## 2017-07-24 RX ADMIN — FLUCONAZOLE SCH MLS/HR: 2 INJECTION, SOLUTION INTRAVENOUS at 09:57

## 2017-07-24 RX ADMIN — MORPHINE SULFATE PRN MG: 2 INJECTION, SOLUTION INTRAMUSCULAR; INTRAVENOUS at 09:33

## 2017-07-24 RX ADMIN — MORPHINE SULFATE PRN MG: 2 INJECTION, SOLUTION INTRAMUSCULAR; INTRAVENOUS at 23:37

## 2017-07-24 NOTE — DIAGNOSTIC IMAGING REPORT
Left hip (4 views)



HISTORY: Status post left hip arthroplasty, surgery.



There is a left hip arthroplasty. Position and alignment are anatomic.

Overlying surgical staples seen within the soft tissues.



IMPRESSION: Status post left hip arthroplasty

## 2017-07-25 LAB
ANION GAP SERPL CALC-SCNC: 10.4 MMOL/L (ref 7–16)
BASOPHILS NFR BLD AUTO: 0 % (ref 0–2)
BUN SERPL-MCNC: 8 MG/DL (ref 7–25)
BUN/CREAT SERPL: 11.4
CALCIUM SERPL-MCNC: 9.3 MG/DL (ref 8.6–10.3)
CHLORIDE SERPL-SCNC: 107 MEQ/L (ref 98–107)
CO2 SERPL-SCNC: 23.3 MEQ/L (ref 21–31)
CREAT SERPL-MCNC: 0.7 MG/DL (ref 0.6–1.2)
EOSINOPHIL NFR BLD AUTO: 0.3 % (ref 0–5)
ERYTHROCYTE [DISTWIDTH] IN BLOOD BY AUTOMATED COUNT: 12.9 % (ref 11.5–20)
GLUCOSE SERPL-MCNC: 189 MG/DL (ref 70–105)
HCT VFR BLD CALC: 24.7 % (ref 35–45)
HGB BLD-MCNC: 8.5 GM/DL (ref 11.7–15.5)
LYMPHOCYTES NFR BLD AUTO: 14.7 % (ref 20–50)
MAGNESIUM SERPL-MCNC: 2.1 MG/DL (ref 1.9–2.7)
MCH RBC QN AUTO: 29.9 PG (ref 27–31)
MCHC RBC AUTO-ENTMCNC: 34.5 PG (ref 28–36)
MCV RBC AUTO: 86.7 FL (ref 81–100)
MONOCYTES NFR BLD AUTO: 7.9 % (ref 2–10)
NEUTROPHILS # BLD: 8 TH/CMM (ref 1.8–8)
NEUTROPHILS NFR BLD AUTO: 77.1 % (ref 40–80)
PLATELET # BLD: 373 TH/CMM (ref 150–400)
PMV BLD AUTO: 8 FL
POTASSIUM SERPL-SCNC: 3.7 MEQ/L (ref 3.5–5.1)
RBC # BLD AUTO: 2.85 MIL/CMM (ref 3.8–5.1)
SODIUM SERPL-SCNC: 137 MEQ/L (ref 136–145)
WBC # BLD AUTO: 10.3 TH/CMM (ref 4.8–10.8)

## 2017-07-25 RX ADMIN — DEXTROSE AND SODIUM CHLORIDE SCH MLS/HR: 5; .45 INJECTION, SOLUTION INTRAVENOUS at 01:54

## 2017-07-25 RX ADMIN — MORPHINE SULFATE PRN MG: 2 INJECTION, SOLUTION INTRAMUSCULAR; INTRAVENOUS at 08:59

## 2017-07-25 RX ADMIN — MORPHINE SULFATE PRN MG: 2 INJECTION, SOLUTION INTRAMUSCULAR; INTRAVENOUS at 17:42

## 2017-07-25 RX ADMIN — MORPHINE SULFATE PRN MG: 2 INJECTION, SOLUTION INTRAMUSCULAR; INTRAVENOUS at 04:33

## 2017-07-25 RX ADMIN — NICOTINE SCH: 21 PATCH TRANSDERMAL at 11:47

## 2017-07-25 RX ADMIN — FLUCONAZOLE SCH MLS/HR: 2 INJECTION, SOLUTION INTRAVENOUS at 08:58

## 2017-07-25 RX ADMIN — MORPHINE SULFATE PRN MG: 2 INJECTION, SOLUTION INTRAMUSCULAR; INTRAVENOUS at 13:26

## 2017-07-25 RX ADMIN — MORPHINE SULFATE PRN MG: 2 INJECTION, SOLUTION INTRAMUSCULAR; INTRAVENOUS at 22:08

## 2017-07-26 RX ADMIN — MORPHINE SULFATE PRN MG: 2 INJECTION, SOLUTION INTRAMUSCULAR; INTRAVENOUS at 08:40

## 2017-07-26 RX ADMIN — MORPHINE SULFATE PRN MG: 2 INJECTION, SOLUTION INTRAMUSCULAR; INTRAVENOUS at 04:13

## 2017-07-26 RX ADMIN — NICOTINE SCH MG: 21 PATCH TRANSDERMAL at 08:34

## 2017-07-26 RX ADMIN — FLUCONAZOLE SCH MLS/HR: 2 INJECTION, SOLUTION INTRAVENOUS at 10:41

## 2017-07-26 RX ADMIN — DEXTROSE AND SODIUM CHLORIDE SCH MLS/HR: 5; .45 INJECTION, SOLUTION INTRAVENOUS at 04:13

## 2017-07-26 RX ADMIN — FLUCONAZOLE SCH: 2 INJECTION, SOLUTION INTRAVENOUS at 10:12

## 2017-07-26 NOTE — DISCHARGE SUMMARY
DATE OF DISCHARGE:     07/26/2017



ADMITTING DIAGNOSES:

1.  Minimally displaced intertrochanteric fracture of the left femur.

2.  Leukocytosis.

3.  Yeast urinary tract infection.



SECONDARY DIAGNOSES:  Include history of asthma, chronic obstructive pulmonary

disease, nicotine dependence, history of bipolar disorder.



DISCHARGE DIAGNOSES:  Status post hemiarthroplasty of the left hip, postop

anemia-stable, urinary tract infection-status post treatment.



CONSULTANTS:  Dr. Brizuela, Orthopedic Surgery.



MAJOR PROCEDURES:  On the 19th she underwent a pelvic and hip x-ray showing a

minimally displaced intratrochanteric fracture.  She also underwent a 2D echo on

07/22/2017, showing no major abnormalities.  Left ventricular ejection fraction

was 60%-65%.  There was a hemiarthroplasty fracture of the left hip with Smith

and Nephew components done on 07/19/2017 and without any complications.



DISCHARGE MEDICATIONS:  Soma 250 at bedtime, gabapentin 600 mg t.i.d., Seroquel

300 mg at bedtime, Erika 60 mg daily, ibuprofen 800 mg p.o. t.i.d. with meals x

10 days, and aspirin 81 daily.



BRIEF HOSPITAL COURSE:  A 47-year-old  female with history of asthma,

COPD, nicotine dependence, who presented to the ER with 4-day history of left

hip pain.  The patient denied any trauma, although she stated that 3 years ago

while she was hospitalized for an apparent lung infection she developed some

left hip pain, given that it became a prolonged hospital stay.  The pain 
eventually

resolved except for on occasions of exertion. 

The patient reported sudden worsening of her pain with no improvement over 4 
days, so

she decided to come into the ER. X-ray showed minimally displaced 
intertrochanteric fracture.  

Other pertinent findings included a white count of 13.5 and UA which was 
consistent with a

yeast UTI.  She was admitted to the Medical Surgical Floor, placed on pain

medications, Lovenox, IV fluids, and Diflucan IV.  She was seen by orthopedic

surgeon and was medically cleared for surgery.  She underwent the

above-mentioned procedure without any complications and her postop care was

uneventful.



CONDITION ON DISCHARGE:  Stable.



DISPOSITION:  The patient will be discharged home with home health for PT and

nursing followup.  She is to follow up with her primary care doctor within a

week and Ortho as scheduled.





DD: 07/26/2017 09:23

DT: 07/26/2017 10:12

JOB# [id number]  [id number]

CARLOS

## 2017-07-27 NOTE — PATHOLOGY REPORT
Collection date:  7/21/2017

Surgeon:  Dr. ANILA Brizuela

Specimen Description:  Right hip femoral head



Gross Description:  Received in formalin is a 4.5 x 4.5 x 5.0 cm femoral head

with a smooth, tan articular surface.  A small portion of attached femoral neck

bone is identified showing hemorrhagic fragmentation with several smaller

portions of femoral neck bone also identified ranging from 0.8 to 1.3 cm in

greatest dimension.  Sectioning shows areas of hemorrhage and fragmentation

consistent with fracture.  Representative sections are submitted in one cassette

following decalcification. 



Microscopic Description:  The histologic sections show articular bone and

cartilage with hemorrhagic fragmentation consistent with fracture.  



Diagnosis:   

Hemorrhagic fragmentation consistent with fracture, left hip.





DD: 07/27/2017 13:14

DT: 07/27/2017 13:41

[facility]# [id number]  [id number]

[facility]

## 2017-07-29 ENCOUNTER — HOSPITAL ENCOUNTER (INPATIENT)
Dept: HOSPITAL 36 - ER | Age: 47
LOS: 6 days | Discharge: NURSING, REHAB OR LONG-TERM CARE | DRG: 349 | End: 2017-08-04
Attending: FAMILY MEDICINE | Admitting: FAMILY MEDICINE
Payer: MEDICAID

## 2017-07-29 DIAGNOSIS — K59.00: ICD-10-CM

## 2017-07-29 DIAGNOSIS — F11.90: ICD-10-CM

## 2017-07-29 DIAGNOSIS — E87.1: ICD-10-CM

## 2017-07-29 DIAGNOSIS — F41.9: ICD-10-CM

## 2017-07-29 DIAGNOSIS — B19.20: ICD-10-CM

## 2017-07-29 DIAGNOSIS — E44.1: ICD-10-CM

## 2017-07-29 DIAGNOSIS — T84.021A: Primary | ICD-10-CM
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
## 2017-07-29 DIAGNOSIS — F17.210: ICD-10-CM

## 2017-07-29 DIAGNOSIS — D64.9: ICD-10-CM

## 2017-07-29 DIAGNOSIS — F31.60: ICD-10-CM

## 2017-07-29 DIAGNOSIS — Z96.642: ICD-10-CM
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
## 2017-07-29 LAB
ANION GAP SERPL CALC-SCNC: 11.8 MMOL/L (ref 7–16)
BUN SERPL-MCNC: 12 MG/DL (ref 7–25)
BUN/CREAT SERPL: 17.1
CALCIUM SERPL-MCNC: 9.2 MG/DL (ref 8.6–10.3)
CHLORIDE SERPL-SCNC: 101 MEQ/L (ref 98–107)
CO2 SERPL-SCNC: 19.7 MEQ/L (ref 21–31)
CREAT SERPL-MCNC: 0.7 MG/DL (ref 0.6–1.2)
GLUCOSE SERPL-MCNC: 106 MG/DL (ref 70–105)
POTASSIUM SERPL-SCNC: 4.5 MEQ/L (ref 3.5–5.1)
SODIUM SERPL-SCNC: 128 MEQ/L (ref 136–145)

## 2017-07-29 PROCEDURE — P9016 RBC LEUKOCYTES REDUCED: HCPCS

## 2017-07-29 PROCEDURE — X6026: HCPCS

## 2017-07-29 PROCEDURE — Z7610: HCPCS

## 2017-07-29 PROCEDURE — X3904: HCPCS

## 2017-07-29 RX ADMIN — HYDROCODONE BITATRATE AND ACETAMINOPHEN PRN TAB: 10; 325 TABLET ORAL at 21:17

## 2017-07-29 NOTE — DIAGNOSTIC IMAGING REPORT
Left hip 2 views



Indication: pain



Comparison: Left hip x-rays on 7/24/2017



Findings: Left hip arthroplasty seen with superolateral dislocation of

the femoral component. No obvious fracture identified. Postsurgical

changes are seen in this region. Small calcifications are seen adjacent

to the lesser trochanter.



Impression:



Superior/lateral dislocation of the left hip prosthesis. Clinical

correlation and follow-up is recommended.



No gross acute fracture identified.



In the setting of trauma, if clinical symptoms persist and there is

continued concern for an occult fracture, follow up exams in 5-7 days is

suggested.

## 2017-07-29 NOTE — ED PHYSICIAN CHART
Chief Complaint/HPI





- Patient Information


Date Seen:: 07/29/17


Time Seen:: 10:30


Chief Complaint:: PAIN IN HER LT HIP


History of Present Illness:: 





This 47-year-old female underwent left hip replacement approximately 10 days 

ago.  When she awoke this morning he had increased pain in the left hip region 

and she was concerned that the hip might have dislocated or been fractured.  

She rates the pain as a 9/10 in severity and states that it's worse when she is 

weightbearing.  Patient denies any fever, chills or diaphoresis.  The patient 

denies any recent falls or injury to the left hip region.


Allergies:: 


 Allergies











Allergy/AdvReac Type Severity Reaction Status Date / Time


 


No Known Allergies Allergy   Verified 07/19/17 13:12











Vitals:: 


 Vital Signs - 8 hr











  07/29/17





  10:23


 


Temp 97.9 F


 


HR 74


 


RR 16


 


/68


 


O2 Sat % 97














Review of Systems





- Review of Systems


General/Constitutional: No fever, No chills, No diaphoresis


Skin: No skin lesions, No rash, No bruising, Other (multiple tattoos.)


Head: No headache, No light-headedness


Eyes: No loss of vision, No diplopia


ENT: No earache, No sore throat


Neck: No neck pain, No swelling, No stiffness, Mass noted


Cardio Vascular: No chest pain, No palpitations, No orthopnea


Pulmonary: No SOB, No cough, No sputum


GI: No nausea, No vomiting, No diarrhea, No constipation


G/U: No dysuria, No frequency, No hematuria


Ob/Gyn: No abnormal vaginal bleed (pain in the region of the left hip.)


Musculoskeletal: No back pain, No muscle pain


Psychiatric: Prior psych history, No suicidal ideation


Hematopoietic: No bruising, No lymphadenopathy


Allergic/Immuno: No urticaria, No angioedema


Neurological: No syncope (weakness in the left lower extremity secondary to 

pain in the hip region.), No headache, No seizure, No dizziness


Other: The patient is mildly confused.





Past Medical History





- Past Medical History


Past Medical History: Other (patient has hepatitis C which was acquired from a 

tattoo.  Has a psychiatric history of anxiety and bipolar disorder with 

psychotic features.)


Family History: None


Social History: Smoker, No Alcohol


Employment:: 





Takes Xanax.


Surgical History: HIP (approximately 10 days ago.), other





Family Medical History





- Family Member


  ** Mother


History Unknown: Yes


Hx Family Cancer: No


Hx Family Congestive Heart Failure: No


Hx Family Hypertension: No


Hx Family Diabetes: No


Hx Family Seizures: No


Hx Family Dementia: No


Hx Family AIDS: No





Physical Exam





- Physical Examination


General/Constitutional: Awake, Well-developed, well-nourished, Alert, Non-toxic 

appearing


Other Gen/Cons comments:: 





Patient is oriented 3.


Head: Atraumatic


Eyes: Lids, conjuctiva normal, PERRL, EOMI (no nystagmus and no jaundice of the 

sclera.)


Skin: Nl inspection, No rash, No ecchymosis, No lymphadenopathy


ENMT: External ears, nose nl, Oropharynx nl, Tonsils nl


Other ENMT comments:: 





Upper dentures.


Neck: Nontender, Full ROM w/o pain, No JVD, No nuchal rigidity, No mass, No 

stridor


Respiratory: Nl effort/Exclusion, Clear to Auscultation


Other Respiratory comments:: 





Occasional expiratory wheezes with no rales or rhonchi.


Cardio Vascular: RRR, No murmur, gallop, rubs, NL S1 S2


Other Cardio Vascular comments:: 





Good distal pulses all 4 extremities.


GI: No tenderness/rebounding/guarding, No organomegaly, No hernia, Normal BS's, 

Nondistended, No mass/bruits, No McBurney tenderness, Rectum exam nl


Other GI comments:: 





Rectal examination deferred at my discretion.


: No CVA tenderness


Extremities: No tenderness or effusion, No edema


Other Extremities comments:: 





The patient has a surgical incision over the left hip with 22 staples.  There 

is no dehiscence of the skin, minimal ecchymosis and no erythema, warmth or 

drainage.  The left lower extremity is shorter than the right.


Neuro/Psych: Alert/oriented, Normal sensory exam


Misc: Normal back, No paraspinal tenderness





Labs/Radiology/EKG Results





- Radiology Results


Results: 





2 views of the left hip are positive for dislocation of the hip.  No acute 

fractures.  The prosthetic appears to be well seated in the femur.  No soft 

tissue abnormalities.  Impression: Dislocation of the left hip.





Assessment





- Assessment


General Assessment: 





CASE SUMMARY:  This 47 sylvia old female had a left hip implant 10 days ago.  She 

was doing well this AM when she had onset of pain in the LT hip area and was 

unable to weight bear or walk.  The leg was shortened compared to the RIGHT 

leg.  The surgical incission was clean and did not appear infected.  X-ray 

showed a dislocation of he left hip.  I made multiple attempts to reduce the 

hip  but was unsuccessful.  The patient was admitted to Dr. Michaud and 

consultation with DR. Cifuentes her orthopedic surgeon.


Admitted in stable condition.





MDM DDX LT HIP PAIN:  NOT hip fracture  based on X-ray studies.  NOT Pelvic 

fracture based on X-ray studies.  NOT hip infection  based on exam and 

alternate diagnosis of hip dislocation.





ED Septic Shock





- .


Is Septic Shock (SBP<90, OR Lactate>4 mmol\L) present?: No





- <6hrs of presentation:


Vital Signs: 


 Vital Signs - 8 hr











  07/29/17





  10:23


 


Temp 97.9 F


 


HR 74


 


RR 16


 


/68


 


O2 Sat % 97














Reassessment (Disposition)





- Reassessment


Reassessment Condition:: Unchanged





- Diagnosis


Diagnosis:: 





DISLOCATION LEFT HIP.





- Patient Disposition


Discharge/Transfer:: Acute Care w/in this hosp


Accepting Physician:: DR. ANDREW





ED Discharge Plan





- Patient Disposition


Admit/Discharge/Transfer: Acute Care w/in this hosp

## 2017-07-30 VITALS — DIASTOLIC BLOOD PRESSURE: 82 MMHG | SYSTOLIC BLOOD PRESSURE: 130 MMHG

## 2017-07-30 LAB
ANION GAP SERPL CALC-SCNC: 5.4 MMOL/L (ref 7–16)
BUN SERPL-MCNC: 9 MG/DL (ref 7–25)
BUN/CREAT SERPL: 18
CALCIUM SERPL-MCNC: 8.4 MG/DL (ref 8.6–10.3)
CHLORIDE SERPL-SCNC: 105 MEQ/L (ref 98–107)
CO2 SERPL-SCNC: 27.2 MEQ/L (ref 21–31)
CREAT SERPL-MCNC: 0.5 MG/DL (ref 0.6–1.2)
ERYTHROCYTE [DISTWIDTH] IN BLOOD BY AUTOMATED COUNT: 13.9 % (ref 11.5–20)
GLUCOSE SERPL-MCNC: 122 MG/DL (ref 70–105)
HCT VFR BLD CALC: 22.2 % (ref 35–45)
HGB BLD-MCNC: 7.6 GM/DL (ref 11.7–15.5)
INR PPP: 0.93 (ref 0.5–1.4)
MCH RBC QN AUTO: 29.5 PG (ref 27–31)
MCHC RBC AUTO-ENTMCNC: 34 PG (ref 28–36)
MCV RBC AUTO: 86.6 FL (ref 81–100)
METAMYELOCYTES NFR BLD: 3 % (ref 0–0)
NEUTROPHILS NFR BLD AUTO: 71 % (ref 40–80)
NEUTS BAND NFR BLD: 3 % (ref 0–10)
PLAT MORPH BLD: NORMAL
PLATELET # BLD EST: (no result) 10*3/UL
PLATELET # BLD: 531 TH/CMM (ref 150–400)
PMV BLD AUTO: 7.5 FL
POIKILOCYTOSIS BLD QL SMEAR: (no result)
POTASSIUM SERPL-SCNC: 3.6 MEQ/L (ref 3.5–5.1)
PROTHROMBIN TIME: 9.7 SECONDS (ref 9.5–11.5)
RBC # BLD AUTO: 2.57 MIL/CMM (ref 3.8–5.1)
RBC MORPH BLD: (no result)
SODIUM SERPL-SCNC: 134 MEQ/L (ref 136–145)
TOTAL CELLS COUNTED BLD: 100
WBC # BLD AUTO: 13.7 TH/CMM (ref 4.8–10.8)

## 2017-07-30 RX ADMIN — HYDROCODONE BITATRATE AND ACETAMINOPHEN PRN TAB: 10; 325 TABLET ORAL at 03:36

## 2017-07-30 NOTE — ADMIT CRITERIA FORM
Admit Criteria Forms





- Admit Criteria


Diagnosis: 








               MUSCULOSKELETAL DISEASE [facility]





Clinical Indications for Admission to Inpatient Care





                                                            (Place 'X' for any 

and all applicable criteria):





Hospital admission is needed for appropriate care of the patient because of 1 

or more of the following:





[X]I.    Fracture, dislocation, or other musculoskeletal injury requiring 

inpatient care(medical) as indicated 


         by 1 or more of the following(4)(5)(6)(7)


         [ ]a)  Vertebral fracture requiring observation for instability or 

neurologic compromise (8)


         [ ]b)  Compartment syndrome (proven or cannot be ruled out during 

observation level of care) (9)


         [ ]c)  Limb-threatening injury


         [ ]d)  Major injury requiring inpatient stabilization such as traction 

initiation or external fixation 


                 before internal fixation or closure of complex or open fracture


         [ X]e)  Major injury requiring inpatient treatment after emergency or 

observation level care (as appropriate)


         [ X]f)   Severe pain requiring acute inpatient management


         [ ]g)  Injury with suspicion of abuse or neglect (eg., child, 

dependent elderly)





[ ]II.    Newly diagnosed or suspected bone, joint, or orthopedic device 

infection (e.g., osteomyelitis, septic arthritis) 


          needing 1 or more of the following(1)(2)(3)


          [ ]a)   IV antibiotics that cannot be initiated in other than 

inpatient setting (e.g., patient too unstable or


                   home infusion not available)


          [ ]b)   Device removal or replacement


          [ ]c)   Bone or soft tissue debridement


          [ ]d)   Joint drainage (drain placement or repetitive aspirations)


[ ]III.    Severe rheumatologic disease (e.g., systemic lupus erythematosus, 

rheumatoid arthritis) with complications


          or comorbidities (Also use Optimal Recovery Care Criteria or General 

Recovery Criteria as appropriate on the 


          basis of predominant condition), including 1 or more of the following(

10)(11)(12)(13)


          [ ]a)  Severe infection (e.g., CNS infection, sepsis) (14)


          [ ]b)  Respiratory complications, including 1 or more of the following

:


                  [ ]i)     Pleural effusion with respiratory compromise      


                  [ ]ii)    Pulmonary hypertension with congestive failure 


                  [ ]iii)   Respiratory failure


                  [ ]iv)   Pulmonary hemorrhage (15)


           [ ]c) Hematologic disease, including 1 or more of the following:


                 [ ]i)     Coagulopathy with bleeding        


                 [ ]ii)    Thrombosis with hypercoagulable state      


                 [ ]iii)   Thrombotic thrombocytopenic purpura 


           [ ]d) Cerebritis with seizures, psychosis, or other severe 

abnormalities


           [ ]e) Vertebral destruction with monitoring needed for cervical 

myelopathy& possible respiratory compromise


           [ ]f)  Exacerbation that requires inpatient treatment (e.g., 

intravenous immunosuppression) (16)


           [ ]g) Acute renal failure


           [ ]h) Cerebritis with seizures, psychosis, Altered mental status, or 

other neurologic abnormalities


           [ ]i) Pericardial effusion with tamponade


           [ ]j) Vertebral destruction, with monitoring needed for cervical 

myelopathy and possible respiratory compromise





[ ]IV. Severe vasculitis with complications or comorbidities (Also use Optimal 

Recovery Care Criteria 


        General Recovery Criteria as appropriate on the basis of predominant 

condition), including


        1 or more of the following(11)(12)(17)(18)(19)(20)


        [ ]a)   Exacerbation that requires inpatient treatment (e.g., 

intravenous immunosuppression) (19)(21)


        [ ]b)    Pulmonary hemorrhage (15)                                     

                                  


        [ ]c)   CNS vasculitis with seizures, psychosis, Altered mental status 

that is severe or persistent, or other severe abnormalities (22)


        [ ]d)  Cerebral infarction                                             


        [ ]e)  Gastrointestinal ischemia 


        [ ]f)   Gangrene or threatened amputation


        [ ]g)  Renal failure (16)   


        [ ]h) Other significant complications of vasculitis ( eg., tissue or 

organ ischemia, organ dysfunction )





[ ]V.  Severe myopathy as indicated by 1 or more of the following (28)(29)


        [ ]a)  New onset of airway compromise or inability to swallow


        [ ]b)  Respiratory deterioration with observation needed for impending 

respiratory failure


        [ ]c)  Exacerbation that requires inpatient treatment (e.g., 

intravenous immunosuppression) 





[ ]VI. Severe crystal gout (arthropathy) indicated by 1 or more of the 

following (23)(24)


        [ ]a)  Severe pain requiring acute inpatient management


        [ ]b)  Exacerbation that requires inpatient treatment (e.g., 

intravenous treatment) 





[ ]VII.Rhabdomyolysis and 1 or more of the following (25)(26)(27)


        [ ]a)  Acute renal failure


        [ ]b)  Need for intravenous hydration after emergency or observation 

level care (as appropriate)


        [ ]c)  Inability to maintain oral hydration


        [ ]d)  Change in mental status


        [ ]e)  Electrolyte abnormality that remains after emergency or 

observation level care (as appropriate) 





[ ]VIII Post amputation complication, as indicated by ANY ONE of the following 


         [ ]a) Infection


         [ ]b) Dehiscence


         [ ]c) Myodesis failure        





[ ]IX. Severe pain requiring acute inpatient management due to musculoskeletal 

condition


       


[ ]X.  Musculoskeletal Disease and ALL of the following:


        [ ]a)  Symptom or finding for which emergency and observation care have 

failed or are not considered 


                appropriate (Use General Criteria: Observation Care as 

appropriate)


        [ ]b)  Presence of ANY ONE of the following


               [ ]i)   A General Admission Criteria    


               [ ]ii)  A Pediatric General Admission Criteria 











The original [facility] content created by [facility] has been revised. 


The portions of the content which have been revised are identified through the 

use of italic text or in bold, and [facility] 


has neither reviewed nor approved the modified material. All other unmodified 

content is copyright  [facility].





Please see references footnoted in the original [facility] edition 

2016


Admit Criteria Met?: Yes

## 2017-07-30 NOTE — DIAGNOSTIC IMAGING REPORT
Left hip 2 views



Indication: Left hip prosthesis dislocation, post reduction attempt



Comparison: none



Findings: There is persistent superior lateral dislocation of the left

hip joint prosthesis. No evidence of a fracture. Postsurgical changes

are noted. Small calcifications are seen adjacent to the lesser

trochanter.



Impression:



Persistent superior and lateral dislocation of the left hip joint

prosthesis.



In the setting of trauma, if clinical symptoms persist and there is

continued concern for an occult fracture, follow up exams in 5-7 days is

suggested.

## 2017-07-30 NOTE — HISTORY AND PHYSICAL
History of Present Illness





- HPI


Chief Complaint: 





Left Hip Pain


HPI: 





47 year old female who recently underwent a left hip replacement 2 weeks ago 

presents to [facility] ER. for left hip pain. Patient states 

that she awoke from bed yesterday with increased pain to the left hip. She 

rated the pain to be 9/10 in severity. She was subsequently seen in the ER and 

an Xray revealed dislocation of the left hip. She denies any fever, chills, or 

diaphoresis. 


Vital Signs: 





 Last Vital Signs











Temp  97.4 F   07/30/17 04:00


 


Pulse  105   07/30/17 04:00


 


Resp  19   07/30/17 04:00


 


BP  117/65   07/30/17 04:00


 


Pulse Ox  98   07/30/17 04:00














Past Medical History


Cardiovascular: Report: No Pertinent Hx


Pulmonary: Report: No Pertinent Hx


CNS: Report: No Pertinent Hx


GI: Report: Other (hepatitis C)


Psych: Report: Anxiety, Bipolar


Musculoskeletal: Report: Other (left hip pain, s/p left hip arthroplasty)


Rheumatologic: Report: No pertinent Hx


Infectious Disease: Report: No Pertinent Hx


Renal/: Report: No Pertinent Hx


Endocrine: Report: No Pertinent Hx


Dermatology: Report: No Pertinent Hx





- Past Surgical History


Past Surgical History: Other (L Hip Arthroplasty)





Family Medical History





- Family Member


  ** Mother


History Unknown: Yes


Hx Family Cancer: No


Hx Family Congestive Heart Failure: No


Hx Family Hypertension: No


Hx Family Diabetes: No


Hx Family Seizures: No


Hx Family Dementia: No


Hx Family AIDS: No





Social History


Smoke: # pack years (history of smoking)


Alcohol: None


Drugs: None


Lives: Alone





- Medications


Home Medications: 


Home Medication











 Medication  Instructions  Recorded  Type


 


Aspirin [Ecotrin] 81 mg PO DAILY 07/19/17 History


 


Carisoprodol [Soma] 250 mg PO HS 07/19/17 History


 


Gabapentin [Neurontin] 600 mg PO TID 07/19/17 History


 


QUEtiapine Fumarate [SEROquel] 300 mg PO HS 07/19/17 History


 


Ziprasidone HCl [Geodon] 60 mg PO DAILY 07/19/17 History














- Allergies


Allergies/Adverse Reactions: 


 Allergies











Allergy/AdvReac Type Severity Reaction Status Date / Time


 


No Known Allergies Allergy   Verified 07/19/17 13:12














Review of Systems





- Review of Systems


Constitutional: Report: No Significant


Eyes: Report: No Significant


ENT: Report: No Significant


Respiratory: Report: No Significant


Cardiovascular: Report: No Significant


Gastrointestinal: Report: No Significant


Genitourinary: Report: No Significant


Musculoskeletal: Report: Other (left hip pain)


Skin: Report: No Significant


Neurological: Report: No Significant





Physical Exam





- Physical Exam


HEENT: Report: Ears Nose Throat within normal limits, Pharnyx within normal 

limits


Neck: Report: Within normal limits


Cardiovascular Systems: Report: +s1/s2 noted, Regular, Rate and Rhythm


Respiratory: Report: Breath Sounds are within normal limits


Abdomen: Report: Non-tender to palpation


Skin: Report: Color of skin is within normal limits


Neuro/Psych: Report: Mood affect is within normal limits, A+Ox3





- Lab Results


All Lab Results last 24 hours: 





 Laboratory Last Values











Sodium  128 mEq/L (136-145)  L  07/29/17  19:08    


 


Potassium  4.5 mEq/L (3.5-5.1)   07/29/17  19:08    


 


Chloride  101 mEq/L ()   07/29/17  19:08    


 


Carbon Dioxide  19.7 mEq/L (21.0-31.0)  L  07/29/17  19:08    


 


Anion Gap  11.8  (7.0-16.0)   07/29/17  19:08    


 


BUN  12 mg/dL (7-25)   07/29/17  19:08    


 


Creatinine  0.7 mg/dL (0.6-1.2)   07/29/17  19:08    


 


Est GFR ( Amer)  > 60.0 ml/min (>90)   07/29/17  19:08    


 


Est GFR (Non-Af Amer)  > 60.0 ml/min  07/29/17  19:08    


 


BUN/Creatinine Ratio  17.1   07/29/17  19:08    


 


Glucose  106 mg/dL ()  H  07/29/17  19:08    


 


Calcium  9.2 mg/dL (8.6-10.3)   07/29/17  19:08    








 Laboratory Results - last 24 hr











  07/29/17





  19:08


 


Sodium  128 L


 


Potassium  4.5


 


Chloride  101


 


Carbon Dioxide  19.7 L


 


Anion Gap  11.8


 


BUN  12


 


Creatinine  0.7


 


Est GFR ( Amer)  > 60.0


 


Est GFR (Non-Af Amer)  > 60.0


 


BUN/Creatinine Ratio  17.1


 


Glucose  106 H


 


Calcium  9.2














- Assessment


Assessment: 





Left hip pain secondary to hip dislocation


hyponatremia


hepatitis C


anxiety disorder


bipolar disorder








- Plan


Plan: 





Left hip pain secondary to dislocation ... will order ortho consult with Dr. Grissom. Will order CBC,CMP. Pain control





Hyponatremia ... will start patient on IV fluids..NS@50cc/hr





Anxiety disoder/Depression by history.

## 2017-07-31 LAB
BACTERIA #/AREA URNS HPF: (no result) /HPF
BILIRUB UR-MCNC: NEGATIVE MG/DL
COLOR UR: (no result)
EPI CELLS URNS QL MICRO: (no result) /LPF
GLUCOSE UR STRIP-MCNC: NEGATIVE MG/DL
HCT VFR BLD CALC: 32.4 % (ref 35–45)
HGB BLD-MCNC: 10.8 GM/DL (ref 11.7–15.5)
KETONES UR STRIP-MCNC: NEGATIVE MG/DL
PH UR STRIP: 6.5 [PH]
PROT UR STRIP-MCNC: NEGATIVE MG/DL
RBC # UR STRIP: NEGATIVE /UL
RBC #/AREA URNS HPF: (no result) /HPF (ref 0–5)
UROBILINOGEN UR STRIP-ACNC: 0.2 E.U./DL (ref 0.2–1)
WBC #/AREA URNS HPF: (no result) /HPF (ref 0–5)

## 2017-07-31 PROCEDURE — 30233N1 TRANSFUSION OF NONAUTOLOGOUS RED BLOOD CELLS INTO PERIPHERAL VEIN, PERCUTANEOUS APPROACH: ICD-10-PCS | Performed by: FAMILY MEDICINE

## 2017-07-31 PROCEDURE — 0QS7XZZ REPOSITION LEFT UPPER FEMUR, EXTERNAL APPROACH: ICD-10-PCS

## 2017-07-31 NOTE — GENERAL PROGRESS NOTE
Subjective





- Review of Systems


Service Date: 17


Subjective: 





Patient was seen and examined. Here for L hip dislocation. For orthopedic 

procedure this AM. S/P blood transfusion 2 units packed RBCs





Objective





- Results


Result Diagrams: 


 17 11:25





 17 11:25


Recent Labs: 


 Laboratory Last Values











WBC  13.7 Th/cmm (4.8-10.8)  H D 17  11:25    


 


RBC  2.57 Mil/cmm (3.80-5.10)  L  17  11:25    


 


Hgb  7.6 gm/dL (11.7-15.5)  L*  17  11:25    


 


Hct  22.2 % (35.0-45.0)  L* D 17  11:25    


 


MCV  86.6 fl ()   17  11:25    


 


MCH  29.5 pg (27.0-31.0)   17  11:25    


 


MCHC Differential  34.0 pg (28.0-36.0)   17  11:25    


 


RDW  13.9 % (11.5-20.0)   17  11:25    


 


Plt Count  531 Th/cmm (150-400)  H D 17  11:25    


 


MPV  7.5 fl  17  11:25    


 


Band Neutrophils %  3 % (0-10)   17  11:25    


 


Neutrophils (Manual)  71 % (40-80)   17  11:25    


 


Lymphocytes  9 % (20-50)  L  17  11:25    


 


Monocytes  14 % (2-10)  H  17  11:25    


 


Metamyelocytes  3 % (0-0)  H  17  11:25    


 


Platelet Estimate  INCREASED PLATELETS  (NORMAL)   17  11:25    


 


Platelet Morphology  NORMAL  (NORMAL)   17  11:25    


 


Poikilocytosis  1+   17  11:25    


 


RBC Morph Micro Appear  ABNORMAL  (NORMAL)   17  11:25    


 


PT  9.7 SECONDS (9.5-11.5)   17  11:25    


 


INR  0.93  (0.5-1.4)   17  11:25    


 


Sodium  134 mEq/L (136-145)  L  17  11:25    


 


Potassium  3.6 mEq/L (3.5-5.1)   17  11:25    


 


Chloride  105 mEq/L ()   17  11:25    


 


Carbon Dioxide  27.2 mEq/L (21.0-31.0)   17  11:25    


 


Anion Gap  5.4  (7.0-16.0)  L  17  11:25    


 


BUN  9 mg/dL (7-25)   17  11:25    


 


Creatinine  0.5 mg/dL (0.6-1.2)  L  17  11:25    


 


Est GFR ( Amer)  > 60.0 ml/min (>90)   17  11:25    


 


Est GFR (Non-Af Amer)  > 60.0 ml/min  17  11:25    


 


BUN/Creatinine Ratio  18.0   17  11:25    


 


Glucose  122 mg/dL ()  H  17  11:25    


 


Calcium  8.4 mg/dL (8.6-10.3)  L  17  11:25    


 


Urine Source  RANDOM   17  22:50    


 


Urine Color  STRAW   17  22:50    


 


Urine Clarity  CLEAR  (CLEAR)   17  22:50    


 


Urine pH  6.5   17  22:50    


 


Ur Specific Gravity  < 1.005  (1.005-1.030)  L  17  22:50    


 


Urine Protein  NEGATIVE mg/dL (NEGATIVE)   17  22:50    


 


Urine Glucose (UA)  NEGATIVE mg/dL (NEGATIVE)   17  22:50    


 


Urine Ketones  NEGATIVE mg/dL (NEGATIVE)   17  22:50    


 


Urine Blood  NEGATIVE  (NEGATIVE)   17  22:50    


 


Urine Nitrate  NEGATIVE  (NEGATIVE)   17  22:50    


 


Urine Bilirubin  NEGATIVE  (NEGATIVE)   17  22:50    


 


Urine Urobilinogen  0.2 E.U./dL (0.2 - 1.0)   17  22:50    


 


Ur Leukocyte Esterase  TRACE  (NEGATIVE)  H  17  22:50    


 


Urine RBC  0-2 /hpf (0-5)   17  22:50    


 


Urine WBC  2-5 /hpf (0-5)   17  22:50    


 


Ur Epithelial Cells  FEW /lpf (FEW)   17  22:50    


 


Urine Bacteria  OCCASIONAL /hpf (NONE SEEN)   17  22:50    


 


Blood Type  A NEGATIVE   17  13:00    


 


Antibody Screen  NEGATIVE   17  13:00    


 


Crossmatch  See Detail   17  13:00    














- Physical Exam


Vitals and I&O: 


 Vital Signs











Temp  98.7 F   17 00:00


 


Pulse  104   17 00:00


 


Resp  17   17 00:00


 


BP  124/81   17 00:00


 


Pulse Ox  97   17 00:00








 Intake & Output











 17





 18:59 06:59 18:59


 


Intake Total 1100 900 


 


Output Total 1000  


 


Balance 100 900 


 


Weight (lbs) 75.75 kg 73.663 kg 


 


Intake:   


 


  Oral 1100 400 


 


  Other  500 


 


Output:   


 


  Urine 1000  


 


Other:   


 


  # Voids  3 


 


  # Bowel Movements 0 0 


 


  Stool Characteristics Soft  











Active Medications: 


Current Medications





Acetaminophen/Hydrocodone Bitart (Norco 10 Mg/325 Mg)  1 tab PO Q6H PRN


   PRN Reason: Pain (Moderate)


   Stop: 17 18:57


   Last Admin: 17 03:36 Dose:  1 tab


Aspirin (Ecotrin)  81 mg PO DAILY [facility]


   Stop: 17 08:59


   Last Admin: 17 08:34 Dose:  81 mg


Carisoprodol (Soma)  350 mg PO HS [facility]


   Stop: 17 20:59


   Last Admin: 17 21:08 Dose:  350 mg


Gabapentin (Neurontin)  600 mg PO TID [facility]


   Stop: 17 20:59


   Last Admin: 17 21:08 Dose:  600 mg


Sodium Chloride (Nacl 0.9%)  1,000 mls @ 50 mls/hr IV .Q20H [facility]


   Stop: 17 07:15


   Last Admin: 17 11:28 Dose:  50 mls/hr


Ceftriaxone Sodium 1 gm/ (Sodium Chloride)  50 mls @ 100 mls/hr IV Q24HR [facility]


   Stop: 17 12:14


   Last Admin: 17 15:02 Dose:  100 mls/hr


Morphine Sulfate (Morphine)  6 mg IV Q8H [facility]


   Stop: 17 00:00


   Last Admin: 17 00:43 Dose:  6 mg


Quetiapine Fumarate (Seroquel)  300 mg PO HS [facility]


   PRN Reason: Protocol


   Stop: 17 20:59


   Last Admin: 17 21:08 Dose:  300 mg


Ziprasidone (Geodon)  60 mg PO DAILY [facility]


   Stop: 17 08:59


   Last Admin: 17 08:34 Dose:  60 mg








General: Alert, Oriented x3


HEENT: Atraumatic, PERRLA, EOMI


Neck: Supple


Cardiovascular: Regular rate, Normal S1, Normal S2


Lungs: Clear to auscultation


Abdomen: Bowel sounds, Soft





- Procedures


Procedures: 


 Procedures











Procedure Code Date


 


 DELIVERY ONLY 89023 05


 


INJECT RH IMMUNE GLOBUL 99.11 05


 


LOW CERVICAL  74.1 05


 


REPLACE OF L HIP JT, FEMORAL WITH SYNTH SUB, OPEN APPROACH [id number] 17


 


TREAT THIGH FRACTURE 40051 17














Assessment/Plan





- Problem List


Patient Problems: 


All Active Problems





Anemia (Acute) D64.9


Anxiety (Acute) F41.9


Dislocation of left hip (Acute) S73.005A


Hepatitis C (Acute) 


Hyponatremia (Acute) E87.1


Left hip pain (Acute) M25.552











- Assessment


Assessment: 





Left hip pain secondary to hip dislocation


hyponatremia


hepatitis C


anxiety disorder


bipolar disorder








- Plan


Plan: 





Left hip pain secondary to dislocation ... patient for orthopedic procedure to 

reduce the left hip.





Hyponatremia ... improving. will continue IV fluids..NS@50cc/hr





Psychiatric disorder .... will continue home medications.  





Anemia S/P blood transfusion of 2 units of packed RBC's. repeat CBC this AM

## 2017-08-01 RX ADMIN — HYDROCODONE BITATRATE AND ACETAMINOPHEN PRN TAB: 10; 325 TABLET ORAL at 04:10

## 2017-08-01 RX ADMIN — HYDROCODONE BITATRATE AND ACETAMINOPHEN PRN TAB: 10; 325 TABLET ORAL at 20:38

## 2017-08-01 RX ADMIN — NICOTINE SCH MG: 14 PATCH TRANSDERMAL at 09:39

## 2017-08-01 RX ADMIN — HYDROCODONE BITATRATE AND ACETAMINOPHEN PRN TAB: 10; 325 TABLET ORAL at 14:35

## 2017-08-01 NOTE — OPERATIVE REPORT
DATE OF SURGERY:  07/31/2017



PREOPERATIVE DIAGNOSIS:  Closed dislocation hemiarthroplasty prosthesis, left

hip.



POSTOPERATIVE DIAGNOSIS:  Closed dislocation hemiarthroplasty prosthesis, left

hip.



SURGEON:  Johan Brizuela M.D.



ASSISTANT:  None.



ANESTHESIOLOGIST:  Dr. Diaz Spencer M.D.



ANESTHESIA:  General anesthesia.



PROCEDURE:

1.  Closed reduction dislocated left hemiarthroplasty hip prosthesis.

2.  Application of bilateral leg casts for postop stabilization of the left hip.



INDICATIONS FOR PROCEDURE:  The patient underwent surgery 10 days ago --

hemiarthroplasty, left hip for fractured femoral neck.  Postoperatively, she was

directed to be on a foam abduction pillow, except when up with therapy and a

walker for partial weightbearing and assistance.



She was noncompliant and get out of bed, went outside in a wheelchair, smoked

and at one point fell out of her wheelchair.



When released from the hospital, she was instructed to use the abduction pillow,

except when actually walking with the walker, partial weightbearing and her left

leg slightly abducted and the toes turned out.



She came into the Emergency Room at [facility] 07/29/2017,

complaining of left hip pain -- she had dislocated her left hip prosthesis. 

Attempts were made to reduce the dislocated hip by the ER personnel and they

were unsuccessful.  Therefore, she was admitted and readied for the procedure to

be done by me today.



DESCRIPTION OF PROCEDURE:  Following the reduction it is planned to place her in

casts on both legs with a rigid bar between the legs, keeping the left hip

slightly abducted and externally rotated so it will be secure and not dislocate

in this noncompliant patient for a period of 4-6 weeks to allow for healing.



She has certainly ripped and torn loose the repaired capsule restored external

rotator muscles done at the time of her original surgery and it would not be

feasible to attempt to operate at this time and try and find and repair these

ripped and torn structures.



Following satisfactory anesthesia by Dr. Spencer, the patient was placed in the

prone position on the operating table with supportive personnel holding her in

place, _____ the left leg was dropped over the side of the table, bringing the

hip into 90 degrees of flexion, the hip was internally rotated and downward

pressure placed on the femur watching with the C-arm until the prosthetic head

came in line with the acetabulum.  The hip was then pushed medial whalen and

brought in the external rotation and extension and the hip reduced.  This was

checked with the C-arm and we appeared to have a concentric reduction.  The hip

was held carefully while she was turned supine.  The hip was again checked with

the C-arm and the reduction remained in place.  The long leg cylinder cast was

placed on the left leg with the knee flexed 25-30 degrees with the hip slightly

abducted and slightly externally rotated.  A thigh cylinder was placed on the

right leg and a bar placed between the 2 casts keeping the legs and hips

abducted and the left hip in slight external rotation.  The final results

reviewed with the C-arm in the cast and the reduction remained in place.



She was allowed to awake and taken to recovery room in good condition.





DD: 08/01/2017 10:43

DT: 08/01/2017 12:27

JOB# [id number]  [id number]

## 2017-08-01 NOTE — GENERAL PROGRESS NOTE
Subjective





- Review of Systems


Service Date: 17


Subjective: 





Patient was seen and examined. Patient currently on Morphine and Norco for pain 

control. Patient states that she has a prior history of psychiatric disorder. 

Patient has had no bowel movement today. Patient underwent closed reduction of 

the left hip. 





Objective





- Results


Result Diagrams: 


 17 14:50





 17 11:25


Recent Labs: 


 Laboratory Last Values











WBC  13.7 Th/cmm (4.8-10.8)  H D 17  11:25    


 


RBC  2.57 Mil/cmm (3.80-5.10)  L  17  11:25    


 


Hgb  10.8 gm/dL (11.7-15.5)  L D 17  14:50    


 


Hct  32.4 % (35.0-45.0)  L D 17  14:50    


 


MCV  86.6 fl ()   17  11:25    


 


MCH  29.5 pg (27.0-31.0)   17  11:25    


 


MCHC Differential  34.0 pg (28.0-36.0)   17  11:25    


 


RDW  13.9 % (11.5-20.0)   17  11:25    


 


Plt Count  531 Th/cmm (150-400)  H D 17  11:25    


 


MPV  7.5 fl  17  11:25    


 


Band Neutrophils %  3 % (0-10)   17  11:25    


 


Neutrophils (Manual)  71 % (40-80)   17  11:25    


 


Lymphocytes  9 % (20-50)  L  17  11:25    


 


Monocytes  14 % (2-10)  H  17  11:25    


 


Metamyelocytes  3 % (0-0)  H  17  11:25    


 


Platelet Estimate  INCREASED PLATELETS  (NORMAL)   17  11:25    


 


Platelet Morphology  NORMAL  (NORMAL)   17  11:25    


 


Poikilocytosis  1+   17  11:25    


 


RBC Morph Micro Appear  ABNORMAL  (NORMAL)   17  11:25    


 


PT  9.7 SECONDS (9.5-11.5)   17  11:25    


 


INR  0.93  (0.5-1.4)   17  11:25    


 


Sodium  134 mEq/L (136-145)  L  17  11:25    


 


Potassium  3.6 mEq/L (3.5-5.1)   17  11:25    


 


Chloride  105 mEq/L ()   17  11:25    


 


Carbon Dioxide  27.2 mEq/L (21.0-31.0)   17  11:25    


 


Anion Gap  5.4  (7.0-16.0)  L  17  11:25    


 


BUN  9 mg/dL (7-25)   17  11:25    


 


Creatinine  0.5 mg/dL (0.6-1.2)  L  17  11:25    


 


Est GFR ( Amer)  > 60.0 ml/min (>90)   17  11:25    


 


Est GFR (Non-Af Amer)  > 60.0 ml/min  17  11:25    


 


BUN/Creatinine Ratio  18.0   17  11:25    


 


Glucose  122 mg/dL ()  H  17  11:25    


 


Calcium  8.4 mg/dL (8.6-10.3)  L  17  11:25    


 


Urine Source  RANDOM   17  22:50    


 


Urine Color  STRAW   17  22:50    


 


Urine Clarity  CLEAR  (CLEAR)   17  22:50    


 


Urine pH  6.5   17  22:50    


 


Ur Specific Gravity  < 1.005  (1.005-1.030)  L  17  22:50    


 


Urine Protein  NEGATIVE mg/dL (NEGATIVE)   17  22:50    


 


Urine Glucose (UA)  NEGATIVE mg/dL (NEGATIVE)   17  22:50    


 


Urine Ketones  NEGATIVE mg/dL (NEGATIVE)   17  22:50    


 


Urine Blood  NEGATIVE  (NEGATIVE)   17  22:50    


 


Urine Nitrate  NEGATIVE  (NEGATIVE)   17  22:50    


 


Urine Bilirubin  NEGATIVE  (NEGATIVE)   17  22:50    


 


Urine Urobilinogen  0.2 E.U./dL (0.2 - 1.0)   17  22:50    


 


Ur Leukocyte Esterase  TRACE  (NEGATIVE)  H  17  22:50    


 


Urine RBC  0-2 /hpf (0-5)   17  22:50    


 


Urine WBC  2-5 /hpf (0-5)   17  22:50    


 


Ur Epithelial Cells  FEW /lpf (FEW)   17  22:50    


 


Urine Bacteria  OCCASIONAL /hpf (NONE SEEN)   17  22:50    


 


Blood Type  A NEGATIVE   17  13:00    


 


Antibody Screen  NEGATIVE   17  13:00    


 


Crossmatch  See Detail   17  13:00    














- Physical Exam


Vitals and I&O: 


 Vital Signs











Temp  98.6 F   17 04:00


 


Pulse  94   17 04:00


 


Resp  19   17 04:00


 


BP  123/85   17 04:00


 


Pulse Ox  98   17 04:00








 Intake & Output











 17





 18:59 06:59 18:59


 


Intake Total 1050 600 


 


Balance 1050 600 


 


Weight (lbs)  73.709 kg 


 


Intake:   


 


  Intake, IV Amount 1050  


 


    Sodium Chloride 0.9% 1, 1000  





    000 ml @ 50 mls/hr IV .   





    Q20H [facility] Rx#:[id number]   


 


    cefTRIAXone 1 gm In 50  





    Sodium Chloride 0.9% 50   





    ml @ 100 mls/hr IV Q24HR   





    [facility] Rx#:[id number]   


 


  Oral  600 


 


Other:   


 


  # Voids  6 


 


  # Bowel Movements  0 


 


  Stool Characteristics Soft  





 Formed  











Active Medications: 


Current Medications





Acetaminophen/Codeine Phosphate (Tylenol W/Codeine #3)  1 tab PO Q4H PRN


   PRN Reason: Pain (Moderate)


   Stop: 17 02:25


Acetaminophen/Hydrocodone Bitart (Norco 10 Mg/325 Mg)  1 tab PO Q6H PRN


   PRN Reason: Pain (Moderate)


   Stop: 17 18:57


   Last Admin: 17 04:10 Dose:  1 tab


Aspirin (Ecotrin)  81 mg PO DAILY [facility]


   Stop: 17 08:59


   Last Admin: 17 08:54 Dose:  81 mg


Carisoprodol (Soma)  350 mg PO HS [facility]


   Stop: 17 20:59


   Last Admin: 17 20:47 Dose:  350 mg


Cephalexin Monohydrate (Keflex)  250 mg PO QID [facility]


   Stop: 17 02:23


   Last Admin: 17 03:05 Dose:  250 mg


Gabapentin (Neurontin)  600 mg PO TID [facility]


   Stop: 17 20:59


   Last Admin: 17 20:47 Dose:  600 mg


Sodium Chloride (Nacl 0.9%)  1,000 mls @ 50 mls/hr IV .Q20H [facility]


   Stop: 17 07:15


   Last Admin: 17 16:20 Dose:  50 mls/hr


Lactated Ringer's (Lactated Ringer)  1,000 mls @ 0 mls/hr IV .Q0M [facility]


   PRN Reason: TKO


   Stop: 17 13:44


Meperidine HCl (Demerol)  12.5 mg IVP UD PRN


   PRN Reason: POST-OP PAIN


   Stop: 17 13:31


   Last Admin: 17 20:47 Dose:  12.5 mg


Morphine Sulfate (Morphine)  6 mg IV Q8H [facility]


   Stop: 17 00:00


   Last Admin: 17 01:07 Dose:  6 mg


Ondansetron HCl (Zofran)  4 mg IV PRN PRN


   PRN Reason: Nausea / Vomiting


   Stop: 17 13:31


Quetiapine Fumarate (Seroquel)  300 mg PO HS [facility]


   PRN Reason: Protocol


   Stop: 17 20:59


   Last Admin: 17 20:47 Dose:  300 mg


Ziprasidone (Geodon)  60 mg PO DAILY [facility]


   Stop: 17 08:59


   Last Admin: 17 08:48 Dose:  60 mg








General: Alert, Oriented x3


HEENT: Atraumatic, PERRLA, EOMI


Neck: Supple


Cardiovascular: Regular rate, Normal S1, Normal S2


Lungs: Clear to auscultation


Abdomen: Bowel sounds, Soft





- Procedures


Procedures: 


 Procedures











Procedure Code Date


 


BLOOD TRANSFUSION SERVICE 43592 17


 


 DELIVERY ONLY 08894 05


 


INJECT RH IMMUNE GLOBUL 99.11 05


 


LOW CERVICAL  74.1 05


 


REPLACE OF L HIP JT, FEMORAL WITH SYNTH SUB, OPEN APPROACH [id number] 17


 


TRANSFUSE NONAUT RED BLOOD CELLS IN PERIPH VEIN, PERC 85236R1 17


 


TREAT THIGH FRACTURE 87262 17














Assessment/Plan





- Problem List


Patient Problems: 


All Active Problems





Anemia (Acute) D64.9


Anxiety (Acute) F41.9


Dislocation of left hip (Acute) S73.005A


Hepatitis C (Acute) 


Hyponatremia (Acute) E87.1


Left hip pain (Acute) M25.552











- Assessment


Assessment: 





Left hip pain secondary to hip dislocation


hyponatremia


hepatitis C


anxiety disorder


bipolar disorder


constipation


s/p closed reduction of the left hip


nicotine dependency. 





- Plan


Plan: 





S/p closed reduction of the Left hip pain  ... continue current treatment per 

ortho. follow up as outpatient.





Hyponatremia ... improving. will continue IV fluids..NS@50cc/hr





Psychiatric disorder .... will continue home medications.  Will obtain 

psychiatric evaluation. 





Anemia S/P blood transfusion of 2 units of packed RBC's.-- repeat CBC this AM





Nicotine dependency -- will order nicotine patch





constipation ... will order stool softeners.

## 2017-08-02 LAB
ALBUMIN/GLOB SERPL: 0.9 {RATIO} (ref 1–1.8)
ALP SERPL-CCNC: 123 U/L (ref 34–104)
ALT SERPL-CCNC: 30 U/L (ref 7–52)
ANION GAP SERPL CALC-SCNC: 9.9 MMOL/L (ref 7–16)
AST SERPL-CCNC: 31 U/L (ref 13–39)
BASOPHILS NFR BLD AUTO: 0 % (ref 0–2)
BILIRUB SERPL-MCNC: 0.3 MG/DL (ref 0.3–1)
BUN SERPL-MCNC: 9 MG/DL (ref 7–25)
BUN/CREAT SERPL: 15
CALCIUM SERPL-MCNC: 10.1 MG/DL (ref 8.6–10.3)
CHLORIDE SERPL-SCNC: 106 MEQ/L (ref 98–107)
CO2 SERPL-SCNC: 24.9 MEQ/L (ref 21–31)
CREAT SERPL-MCNC: 0.6 MG/DL (ref 0.6–1.2)
EOSINOPHIL NFR BLD AUTO: 0.3 % (ref 0–5)
ERYTHROCYTE [DISTWIDTH] IN BLOOD BY AUTOMATED COUNT: 13.9 % (ref 11.5–20)
GLOBULIN SER-MCNC: 3.8 GM/DL
GLUCOSE SERPL-MCNC: 133 MG/DL (ref 70–105)
HCT VFR BLD CALC: 37.6 % (ref 35–45)
HGB BLD-MCNC: 12.3 GM/DL (ref 11.7–15.5)
LYMPHOCYTES NFR BLD AUTO: 18.6 % (ref 20–50)
MCH RBC QN AUTO: 28.4 PG (ref 27–31)
MCHC RBC AUTO-ENTMCNC: 32.6 PG (ref 28–36)
MCV RBC AUTO: 87.2 FL (ref 81–100)
MONOCYTES NFR BLD AUTO: 7.2 % (ref 2–10)
NEUTROPHILS # BLD: 7.2 TH/CMM (ref 1.8–8)
NEUTROPHILS NFR BLD AUTO: 73.9 % (ref 40–80)
PLATELET # BLD: 528 TH/CMM (ref 150–400)
PMV BLD AUTO: 7.6 FL
POTASSIUM SERPL-SCNC: 3.8 MEQ/L (ref 3.5–5.1)
RBC # BLD AUTO: 4.31 MIL/CMM (ref 3.8–5.1)
SODIUM SERPL-SCNC: 137 MEQ/L (ref 136–145)
WBC # BLD AUTO: 9.7 TH/CMM (ref 4.8–10.8)

## 2017-08-02 RX ADMIN — HYDROCODONE BITATRATE AND ACETAMINOPHEN PRN TAB: 10; 325 TABLET ORAL at 21:58

## 2017-08-02 RX ADMIN — NICOTINE SCH MG: 14 PATCH TRANSDERMAL at 08:39

## 2017-08-02 RX ADMIN — HYDROCODONE BITATRATE AND ACETAMINOPHEN PRN TAB: 10; 325 TABLET ORAL at 04:59

## 2017-08-02 RX ADMIN — ACETAMINOPHEN AND CODEINE PHOSPHATE PRN TAB: 300; 30 TABLET ORAL at 17:01

## 2017-08-02 RX ADMIN — HYDROCODONE BITATRATE AND ACETAMINOPHEN PRN TAB: 10; 325 TABLET ORAL at 13:14

## 2017-08-02 NOTE — CONSULTATION
DATE OF CONSULTATION:  08/02/2017



AGE:  47.



SEX:  Female.



PHYSICIAN:  Dr. Manrique.



CHIEF COMPLAINT:  Left hip pain.



HISTORY OF PRESENT ILLNESS:  The patient is a 47-year-old female who was

admitted to the hospital because of increased left hip pain.  The patient had

left hip replacement about 2 weeks ago and the patient has been in pain since

then.  The patient has history of bipolar disorder.  The patient said that she

has been anxious and depressed because of hip pain, but she denies any thoughts

of suicide or homicide.  She also has been compliant with taking her medications

with Neurontin, Seroquel, and Geodon with no side effects.



PAST PSYCHIATRIC HISTORY:  History of treatment of bipolar disorder.



PAST MEDICAL HISTORY:  The patient had hip replacement 2 weeks ago.



SOCIAL HISTORY:  The patient is  and lives with her  and 4

children.  She denies alcohol or street drug use.  She is unemployed and denies

any legal issues or abuse issues.



MENTAL STATUS EXAM:  The patient appears her stated age.  Anxious.  Mood not

depressed nor elated.  Cooperative.  Thought processes are goal directed.  The

patient denies auditory or visual hallucinations or delusions.  She denies any

suicidal or homicidal ideations.  The patient is alert and oriented to time,

place, person, and situation.  Intact immediate, recent and remote memories. 

Fair insight and fair judgment.



ASSESSMENT:

PRIMARY DIAGNOSIS:  Bipolar disorder, mixed type.



TREATMENT PLAN:  We will monitor the patient's behavior closely.  We will

continue Seroquel and the Geodon and the Neurontin.  Also, the patient will need

to continue her outpatient treatment.



Thanks to Dr. Ho and we will follow with you while in [facility].





DD: 08/02/2017 07:25

DT: 08/02/2017 08:48

JOB# [id number]  [id number]

## 2017-08-02 NOTE — GENERAL PROGRESS NOTE
Subjective





- Review of Systems


Service Date: 17


Subjective: 





Patient was seen and examined status post left hip closed reduction. Patient on 

pain control. BM today and yesterday. Patient evaluated by psychiatry please 

see dictated report. Continue current treatment per ortho. 





Objective





- Results


Result Diagrams: 


 17 14:50





 17 11:25


Recent Labs: 


 Laboratory Last Values











WBC  13.7 Th/cmm (4.8-10.8)  H D 17  11:25    


 


RBC  2.57 Mil/cmm (3.80-5.10)  L  17  11:25    


 


Hgb  10.8 gm/dL (11.7-15.5)  L D 17  14:50    


 


Hct  32.4 % (35.0-45.0)  L D 17  14:50    


 


MCV  86.6 fl ()   17  11:25    


 


MCH  29.5 pg (27.0-31.0)   17  11:25    


 


MCHC Differential  34.0 pg (28.0-36.0)   17  11:25    


 


RDW  13.9 % (11.5-20.0)   17  11:25    


 


Plt Count  531 Th/cmm (150-400)  H D 17  11:25    


 


MPV  7.5 fl  17  11:25    


 


Band Neutrophils %  3 % (0-10)   17  11:25    


 


Neutrophils (Manual)  71 % (40-80)   17  11:25    


 


Lymphocytes  9 % (20-50)  L  17  11:25    


 


Monocytes  14 % (2-10)  H  17  11:25    


 


Metamyelocytes  3 % (0-0)  H  17  11:25    


 


Platelet Estimate  INCREASED PLATELETS  (NORMAL)   17  11:25    


 


Platelet Morphology  NORMAL  (NORMAL)   17  11:25    


 


Poikilocytosis  1+   17  11:25    


 


RBC Morph Micro Appear  ABNORMAL  (NORMAL)   17  11:25    


 


PT  9.7 SECONDS (9.5-11.5)   17  11:25    


 


INR  0.93  (0.5-1.4)   17  11:25    


 


Sodium  134 mEq/L (136-145)  L  17  11:25    


 


Potassium  3.6 mEq/L (3.5-5.1)   17  11:25    


 


Chloride  105 mEq/L ()   17  11:25    


 


Carbon Dioxide  27.2 mEq/L (21.0-31.0)   17  11:25    


 


Anion Gap  5.4  (7.0-16.0)  L  17  11:25    


 


BUN  9 mg/dL (7-25)   17  11:25    


 


Creatinine  0.5 mg/dL (0.6-1.2)  L  17  11:25    


 


Est GFR ( Amer)  > 60.0 ml/min (>90)   17  11:25    


 


Est GFR (Non-Af Amer)  > 60.0 ml/min  17  11:25    


 


BUN/Creatinine Ratio  18.0   17  11:25    


 


Glucose  122 mg/dL ()  H  17  11:25    


 


Calcium  8.4 mg/dL (8.6-10.3)  L  17  11:25    


 


Urine Source  RANDOM   17  22:50    


 


Urine Color  STRAW   17  22:50    


 


Urine Clarity  CLEAR  (CLEAR)   17  22:50    


 


Urine pH  6.5   17  22:50    


 


Ur Specific Gravity  < 1.005  (1.005-1.030)  L  17  22:50    


 


Urine Protein  NEGATIVE mg/dL (NEGATIVE)   17  22:50    


 


Urine Glucose (UA)  NEGATIVE mg/dL (NEGATIVE)   17  22:50    


 


Urine Ketones  NEGATIVE mg/dL (NEGATIVE)   17  22:50    


 


Urine Blood  NEGATIVE  (NEGATIVE)   17  22:50    


 


Urine Nitrate  NEGATIVE  (NEGATIVE)   17  22:50    


 


Urine Bilirubin  NEGATIVE  (NEGATIVE)   17  22:50    


 


Urine Urobilinogen  0.2 E.U./dL (0.2 - 1.0)   17  22:50    


 


Ur Leukocyte Esterase  TRACE  (NEGATIVE)  H  17  22:50    


 


Urine RBC  0-2 /hpf (0-5)   17  22:50    


 


Urine WBC  2-5 /hpf (0-5)   17  22:50    


 


Ur Epithelial Cells  FEW /lpf (FEW)   17  22:50    


 


Urine Bacteria  OCCASIONAL /hpf (NONE SEEN)   17  22:50    


 


Blood Type  A NEGATIVE   17  13:00    


 


Antibody Screen  NEGATIVE   17  13:00    


 


Crossmatch  See Detail   17  13:00    














- Physical Exam


Vitals and I&O: 


 Vital Signs











Temp  98.6 F   17 03:56


 


Pulse  101   17 03:56


 


Resp  18   17 03:56


 


BP  136/86   17 03:56


 


Pulse Ox  98   17 03:56








 Intake & Output











 17





 18:59 06:59 18:59


 


Intake Total 1000 400 


 


Balance 1000 400 


 


Weight (lbs)  73.709 kg 


 


Intake:   


 


  Intake, IV Amount 1000  


 


    Sodium Chloride 0.9% 1, 1000  





    000 ml @ 50 mls/hr IV .   





    Q20H [facility] Rx#:[id number]   


 


  Oral  400 


 


Other:   


 


  # Voids  5 


 


  # Bowel Movements  0 











Active Medications: 


Current Medications





Acetaminophen/Codeine Phosphate (Tylenol W/Codeine #3)  1 tab PO Q4H PRN


   PRN Reason: Pain (Moderate)


   Stop: 17 02:25


Acetaminophen/Hydrocodone Bitart (Norco 10 Mg/325 Mg)  1 tab PO Q6H PRN


   PRN Reason: Pain (Moderate)


   Stop: 17 18:57


   Last Admin: 17 04:59 Dose:  1 tab


Aspirin (Ecotrin)  81 mg PO DAILY [facility]


   Stop: 17 08:59


   Last Admin: 17 09:14 Dose:  81 mg


Carisoprodol (Soma)  350 mg PO HS [facility]


   Stop: 17 20:59


   Last Admin: 17 20:32 Dose:  350 mg


Cephalexin Monohydrate (Keflex)  250 mg PO QID [facility]


   Stop: 17 02:23


   Last Admin: 17 20:33 Dose:  250 mg


Docusate Sodium (Colace)  100 mg PO DAILY [facility]


   Stop: 17 08:59


   Last Admin: 17 09:48 Dose:  Not Given


Gabapentin (Neurontin)  600 mg PO TID [facility]


   Stop: 17 20:59


   Last Admin: 17 20:32 Dose:  600 mg


Sodium Chloride (Nacl 0.9%)  1,000 mls @ 50 mls/hr IV .Q20H [facility]


   Stop: 17 07:15


   Last Admin: 17 16:44 Dose:  50 mls/hr


Morphine Sulfate (Morphine)  6 mg IV Q8H [facility]


   Stop: 17 00:00


   Last Admin: 17 00:06 Dose:  6 mg


Nicotine (Nicotine Transdermal System)  14 mg TD DAILY [facility]


   Stop: 17 08:59


   Last Admin: 17 09:39 Dose:  14 mg


Quetiapine Fumarate (Seroquel)  300 mg PO HS [facility]


   PRN Reason: Protocol


   Stop: 17 20:59


   Last Admin: 17 20:33 Dose:  300 mg


Ziprasidone (Geodon)  60 mg PO DAILY [facility]


   Stop: 17 08:59


   Last Admin: 17 09:14 Dose:  60 mg








General: Alert, Oriented x3


HEENT: Atraumatic, PERRLA, EOMI


Neck: Supple


Cardiovascular: Regular rate, Normal S1, Normal S2


Lungs: Clear to auscultation


Abdomen: Bowel sounds, Soft





- Procedures


Procedures: 


 Procedures











Procedure Code Date


 


BLOOD TRANSFUSION SERVICE 38559 17


 


 DELIVERY ONLY 84663 05


 


INJECT RH IMMUNE GLOBUL 99.11 05


 


LOW CERVICAL  74.1 05


 


REPLACE OF L HIP JT, FEMORAL WITH SYNTH SUB, OPEN APPROACH [id number] 17


 


REPOSITION LEFT UPPER FEMUR, EXTERNAL APPROACH [id number] 17


 


TRANSFUSE NONAUT RED BLOOD CELLS IN PERIPH VEIN, PERC [id number] 17


 


TREAT HIP DISLOCATION 18467 17


 


TREAT THIGH FRACTURE 53577 17














Assessment/Plan





- Problem List


Patient Problems: 


All Active Problems





Anemia (Acute) D64.9


Anxiety (Acute) F41.9


Dislocation of left hip (Acute) S73.005A


Hepatitis C (Acute) 


Hyponatremia (Acute) E87.1


Left hip pain (Acute) M25.552


Status post closed reduction of dislocated total hip prosthesis (Acute) Z98.890











- Assessment


Assessment: 





Left hip pain secondary to hip dislocation


hyponatremia


hepatitis C


anxiety disorder


bipolar disorder


constipation


s/p closed reduction of the left hip


nicotine dependency. 





- Plan


Plan: 





S/p closed reduction of the Left hip pain  ... continue current treatment per 

ortho. for physical therapy.





Hyponatremia ... improving. will continue IV fluids..NS@50cc/hr will repeat CMP





Psychiatric disorder .... will continue home medications.  Will obtain 

psychiatric evaluation. Please see dictated report 





Anemia S/P blood transfusion of 2 units of packed RBC's.-- repeat CBC this AM





Nicotine dependency -- will order nicotine patch





constipation ... will order stool softeners.

## 2017-08-02 NOTE — DIAGNOSTIC IMAGING REPORT
Fluoroscopy was utilized to facilitation of left hip arthroplasty

procedure. Please refer to the surgical report for complete details. The

total fluoroscopic time for the exam was 27 seconds.

## 2017-08-03 RX ADMIN — HYDROCODONE BITATRATE AND ACETAMINOPHEN PRN TAB: 10; 325 TABLET ORAL at 04:16

## 2017-08-03 RX ADMIN — NICOTINE SCH: 14 PATCH TRANSDERMAL at 09:47

## 2017-08-04 RX ADMIN — ACETAMINOPHEN AND CODEINE PHOSPHATE PRN TAB: 300; 30 TABLET ORAL at 04:27

## 2017-08-04 RX ADMIN — HYDROCODONE BITATRATE AND ACETAMINOPHEN PRN TAB: 10; 325 TABLET ORAL at 16:33

## 2017-08-04 RX ADMIN — NICOTINE SCH: 14 PATCH TRANSDERMAL at 08:42

## 2017-08-04 RX ADMIN — HYDROCODONE BITATRATE AND ACETAMINOPHEN PRN TAB: 10; 325 TABLET ORAL at 02:47

## 2017-08-04 NOTE — GENERAL PROGRESS NOTE
Subjective





- Review of Systems


Service Date: 17


Subjective: 





Patient was seen and examined status post left hip closed reduction. Patient on 

pain control. BM today and yesterday. Patient evaluated by psychiatry please 

see dictated report. Continue current treatment per ortho.





Objective





- Results


Result Diagrams: 


 17 13:05





 17 13:05


Recent Labs: 


 Laboratory Last Values











WBC  9.7 Th/cmm (4.8-10.8)  D 17  13:05    


 


RBC  4.31 Mil/cmm (3.80-5.10)   17  13:05    


 


Hgb  12.3 gm/dL (11.7-15.5)   17  13:05    


 


Hct  37.6 % (35.0-45.0)  D 17  13:05    


 


MCV  87.2 fl ()   17  13:05    


 


MCH  28.4 pg (27.0-31.0)   17  13:05    


 


MCHC Differential  32.6 pg (28.0-36.0)   17  13:05    


 


RDW  13.9 % (11.5-20.0)   17  13:05    


 


Plt Count  528 Th/cmm (150-400)  H  17  13:05    


 


MPV  7.6 fl  17  13:05    


 


Neutrophils %  73.9 % (40.0-80.0)   17  13:05    


 


Band Neutrophils %  3 % (0-10)   17  11:25    


 


Lymphocytes %  18.6 % (20.0-50.0)  L  17  13:05    


 


Monocytes %  7.2 % (2.0-10.0)   17  13:05    


 


Eosinophils %  0.3 % (0.0-5.0)   17  13:05    


 


Basophils %  0.0 % (0.0-2.0)   17  13:05    


 


Neutrophils (Manual)  71 % (40-80)   17  11:25    


 


Lymphocytes  9 % (20-50)  L  17  11:25    


 


Monocytes  14 % (2-10)  H  17  11:25    


 


Metamyelocytes  3 % (0-0)  H  17  11:25    


 


Platelet Estimate  INCREASED PLATELETS  (NORMAL)   17  11:25    


 


Platelet Morphology  NORMAL  (NORMAL)   17  11:25    


 


Poikilocytosis  1+   17  11:25    


 


RBC Morph Micro Appear  ABNORMAL  (NORMAL)   17  11:25    


 


PT  9.7 SECONDS (9.5-11.5)   17  11:25    


 


INR  0.93  (0.5-1.4)   17  11:25    


 


Sodium  137 mEq/L (136-145)   17  13:05    


 


Potassium  3.8 mEq/L (3.5-5.1)   17  13:05    


 


Chloride  106 mEq/L ()   17  13:05    


 


Carbon Dioxide  24.9 mEq/L (21.0-31.0)   17  13:05    


 


Anion Gap  9.9  (7.0-16.0)   17  13:05    


 


BUN  9 mg/dL (7-25)   17  13:05    


 


Creatinine  0.6 mg/dL (0.6-1.2)   17  13:05    


 


Est GFR ( Amer)  > 60.0 ml/min (>90)   17  13:05    


 


Est GFR (Non-Af Amer)  > 60.0 ml/min  17  13:05    


 


BUN/Creatinine Ratio  15.0   17  13:05    


 


Glucose  133 mg/dL ()  H  17  13:05    


 


Calcium  10.1 mg/dL (8.6-10.3)   17  13:05    


 


Total Bilirubin  0.3 mg/dL (0.3-1.0)   17  13:05    


 


AST  31 U/L (13-39)   17  13:05    


 


ALT  30 U/L (7-52)   17  13:05    


 


Alkaline Phosphatase  123 U/L ()  H  17  13:05    


 


Total Protein  7.1 gm/dL (6.0-8.3)   17  13:05    


 


Albumin  3.3 gm/dL (3.7-5.3)  L  17  13:05    


 


Globulin  3.8 gm/dL  17  13:05    


 


Albumin/Globulin Ratio  0.9  (1.0-1.8)  L  17  13:05    


 


Urine Source  RANDOM   17  22:50    


 


Urine Color  STRAW   17  22:50    


 


Urine Clarity  CLEAR  (CLEAR)   17  22:50    


 


Urine pH  6.5   17  22:50    


 


Ur Specific Gravity  < 1.005  (1.005-1.030)  L  17  22:50    


 


Urine Protein  NEGATIVE mg/dL (NEGATIVE)   17  22:50    


 


Urine Glucose (UA)  NEGATIVE mg/dL (NEGATIVE)   17  22:50    


 


Urine Ketones  NEGATIVE mg/dL (NEGATIVE)   17  22:50    


 


Urine Blood  NEGATIVE  (NEGATIVE)   17  22:50    


 


Urine Nitrate  NEGATIVE  (NEGATIVE)   17  22:50    


 


Urine Bilirubin  NEGATIVE  (NEGATIVE)   17  22:50    


 


Urine Urobilinogen  0.2 E.U./dL (0.2 - 1.0)   17  22:50    


 


Ur Leukocyte Esterase  TRACE  (NEGATIVE)  H  17  22:50    


 


Urine RBC  0-2 /hpf (0-5)   17  22:50    


 


Urine WBC  2-5 /hpf (0-5)   17  22:50    


 


Ur Epithelial Cells  FEW /lpf (FEW)   17  22:50    


 


Urine Bacteria  OCCASIONAL /hpf (NONE SEEN)   17  22:50    


 


Blood Type  A NEGATIVE   17  13:00    


 


Antibody Screen  NEGATIVE   17  13:00    


 


Crossmatch  See Detail   17  13:00    














- Physical Exam


Vitals and I&O: 


 Vital Signs











Temp  98.0 F   17 04:00


 


Pulse  94   17 04:00


 


Resp  20   17 04:00


 


BP  148/95   17 04:00


 


Pulse Ox  98   17 04:00








 Intake & Output











 17





 18:59 06:59 18:59


 


Intake Total 480 976.667 


 


Output Total  0 


 


Balance 480 976.667 


 


Weight (lbs) 73.482 kg 73.482 kg 


 


Intake:   


 


  Intake, IV Amount  636.667 


 


    Sodium Chloride 0.9% 1,  636.667 





    000 ml @ 50 mls/hr IV .   





    Q20H [facility] Rx#:[id number]   


 


  Oral 480 340 


 


Output:   


 


  Stool  0 


 


Other:   


 


  # Voids  2 


 


  # Bowel Movements 1 0 











Active Medications: 


Current Medications





Acetaminophen/Codeine Phosphate (Tylenol W/Codeine #3)  1 tab PO Q4H PRN


   PRN Reason: Pain (Moderate)


   Stop: 17 02:25


   Last Admin: 17 04:27 Dose:  1 tab


Acetaminophen/Hydrocodone Bitart (Norco 10 Mg/325 Mg)  1 tab PO Q6H PRN


   PRN Reason: Pain (Moderate)


   Stop: 17 18:57


   Last Admin: 17 02:47 Dose:  1 tab


Alprazolam (Xanax)  0.25 mg PO Q6HR PRN; Protocol


   PRN Reason: Agitation


   Stop: 10/02/17 13:16


   Last Admin: 17 01:53 Dose:  0.25 mg


Aspirin (Ecotrin)  81 mg PO DAILY [facility]


   Stop: 17 08:59


   Last Admin: 17 09:48 Dose:  Not Given


Carisoprodol (Soma)  350 mg PO HS [facility]


   Stop: 17 20:59


   Last Admin: 17 20:18 Dose:  350 mg


Cephalexin Monohydrate (Keflex)  250 mg PO QID [facility]


   Stop: 17 02:23


   Last Admin: 17 20:18 Dose:  250 mg


Docusate Sodium (Colace)  100 mg PO DAILY [facility]


   Stop: 17 08:59


   Last Admin: 17 09:47 Dose:  Not Given


Gabapentin (Neurontin)  600 mg PO TID [facility]


   Stop: 17 20:59


   Last Admin: 17 20:18 Dose:  600 mg


Sodium Chloride (Nacl 0.9%)  1,000 mls @ 50 mls/hr IV .Q20H [facility]


   Stop: 17 07:15


   Last Admin: 17 22:30 Dose:  50 mls/hr


Methadone HCl (Methadone)  80 mg PO DAILY [facility]


   Stop: 10/03/17 08:59


Nicotine (Nicotine Transdermal System)  14 mg TD DAILY [facility]


   Stop: 17 08:59


   Last Admin: 17 09:47 Dose:  Not Given


Quetiapine Fumarate (Seroquel)  300 mg PO HS [facility]


   PRN Reason: Protocol


   Stop: 17 20:59


   Last Admin: 17 20:18 Dose:  300 mg


Ziprasidone (Geodon)  60 mg PO DAILY [facility]


   Stop: 17 08:59


   Last Admin: 17 09:47 Dose:  60 mg








General: Alert, Oriented x3


HEENT: Atraumatic, PERRLA, EOMI


Neck: Supple


Cardiovascular: Regular rate, Normal S1, Normal S2


Lungs: Clear to auscultation


Abdomen: Bowel sounds, Soft





- Procedures


Procedures: 


 Procedures











Procedure Code Date


 


BLOOD TRANSFUSION SERVICE 70615 17


 


 DELIVERY ONLY 48344 05


 


INJECT RH IMMUNE GLOBUL 99.11 05


 


LOW CERVICAL  74.1 05


 


REPLACE OF L HIP JT, FEMORAL WITH SYNTH SUB, OPEN APPROACH [id number] 17


 


REPOSITION LEFT UPPER FEMUR, EXTERNAL APPROACH [id number] 17


 


TRANSFUSE NONAUT RED BLOOD CELLS IN PERIPH VEIN, PERC 45710D4 17


 


TREAT HIP DISLOCATION 29722 17


 


TREAT THIGH FRACTURE 84409 17














Assessment/Plan





- Problem List


Patient Problems: 


All Active Problems





Anemia (Acute) D64.9


Anxiety (Acute) F41.9


Dislocation of left hip (Acute) S73.005A


Hepatitis C (Acute) 


Hyponatremia (Acute) E87.1


Left hip pain (Acute) M25.552


Status post closed reduction of dislocated total hip prosthesis (Acute) Z98.890











- Assessment


Assessment: 





Left hip pain secondary to hip dislocation


hyponatremia


hepatitis C


anxiety disorder


bipolar disorder


constipation


s/p closed reduction of the left hip


nicotine dependency. 





- Plan


Plan: 





S/p closed reduction of the Left hip pain  ... continue current treatment per 

ortho. for physical therapy.





Hyponatremia ... improving. will continue IV fluids..NS@50cc/hr will repeat CMP





Psychiatric disorder .... will continue home medications.  Will obtain 

psychiatric evaluation. Please see dictated report 





Anemia S/P blood transfusion of 2 units of packed RBC's.-- repeat CBC this AM





Nicotine dependency -- will order nicotine patch





constipation ... will order stool softeners. 





chronic use of narcotics .... on methadone per psychiatry.

## 2017-08-05 NOTE — DISCHARGE SUMMARY
General Discharge Summary





- Discharge Summary


Date of Admission: 07/30/17


Admitting Diagnosis: Left Hip Pain


Patient Problems: 


All Active Problems





Anemia (Acute) D64.9


Anxiety (Acute) F41.9


Dislocation of left hip (Acute) S73.005A


Hepatitis C (Acute) 


Hyponatremia (Acute) E87.1


Left hip pain (Acute) M25.552


Status post closed reduction of dislocated total hip prosthesis (Acute) Z98.890








Discharge Date: 08/04/17


Discharge Diagnosis: Same as above


Laboratory Findings: 


 Laboratory Tests











  07/29/17 07/30/17 07/30/17





  19:08 11:25 11:25


 


WBC    13.7 H D


 


RBC    2.57 L


 


Hgb    7.6 L*


 


Hct    22.2 L* D


 


MCV    86.6


 


MCH    29.5


 


MCHC Differential    34.0


 


RDW    13.9


 


Plt Count    531 H D


 


MPV    7.5


 


Neutrophils %   


 


Band Neutrophils %    3


 


Lymphocytes %   


 


Monocytes %   


 


Eosinophils %   


 


Basophils %   


 


Neutrophils (Manual)    71


 


Lymphocytes    9 L


 


Monocytes    14 H


 


Metamyelocytes    3 H


 


Platelet Estimate    INCREASED PLATELETS


 


Platelet Morphology    NORMAL


 


Poikilocytosis    1+


 


RBC Morph Micro Appear    ABNORMAL


 


PT   9.7 


 


INR   0.93 


 


Sodium  128 L  


 


Potassium  4.5  


 


Chloride  101  


 


Carbon Dioxide  19.7 L  


 


Anion Gap  11.8  


 


BUN  12  


 


Creatinine  0.7  


 


Est GFR ( Amer)  > 60.0  


 


Est GFR (Non-Af Amer)  > 60.0  


 


BUN/Creatinine Ratio  17.1  


 


Glucose  106 H  


 


Calcium  9.2  


 


Total Bilirubin   


 


AST   


 


ALT   


 


Alkaline Phosphatase   


 


Total Protein   


 


Albumin   


 


Globulin   


 


Albumin/Globulin Ratio   


 


Urine Source   


 


Urine Color   


 


Urine Clarity   


 


Urine pH   


 


Ur Specific Gravity   


 


Urine Protein   


 


Urine Glucose (UA)   


 


Urine Ketones   


 


Urine Blood   


 


Urine Nitrate   


 


Urine Bilirubin   


 


Urine Urobilinogen   


 


Ur Leukocyte Esterase   


 


Urine RBC   


 


Urine WBC   


 


Ur Epithelial Cells   


 


Urine Bacteria   


 


Blood Type   


 


Antibody Screen   


 


Crossmatch   














  07/30/17 07/30/17 07/30/17





  11:25 13:00 22:50


 


WBC   


 


RBC   


 


Hgb   


 


Hct   


 


MCV   


 


MCH   


 


MCHC Differential   


 


RDW   


 


Plt Count   


 


MPV   


 


Neutrophils %   


 


Band Neutrophils %   


 


Lymphocytes %   


 


Monocytes %   


 


Eosinophils %   


 


Basophils %   


 


Neutrophils (Manual)   


 


Lymphocytes   


 


Monocytes   


 


Metamyelocytes   


 


Platelet Estimate   


 


Platelet Morphology   


 


Poikilocytosis   


 


RBC Morph Micro Appear   


 


PT   


 


INR   


 


Sodium  134 L  


 


Potassium  3.6  


 


Chloride  105  


 


Carbon Dioxide  27.2  


 


Anion Gap  5.4 L  


 


BUN  9  


 


Creatinine  0.5 L  


 


Est GFR ( Amer)  > 60.0  


 


Est GFR (Non-Af Amer)  > 60.0  


 


BUN/Creatinine Ratio  18.0  


 


Glucose  122 H  


 


Calcium  8.4 L  


 


Total Bilirubin   


 


AST   


 


ALT   


 


Alkaline Phosphatase   


 


Total Protein   


 


Albumin   


 


Globulin   


 


Albumin/Globulin Ratio   


 


Urine Source    RANDOM


 


Urine Color    STRAW


 


Urine Clarity    CLEAR


 


Urine pH    6.5


 


Ur Specific Gravity    < 1.005 L


 


Urine Protein    NEGATIVE


 


Urine Glucose (UA)    NEGATIVE


 


Urine Ketones    NEGATIVE


 


Urine Blood    NEGATIVE


 


Urine Nitrate    NEGATIVE


 


Urine Bilirubin    NEGATIVE


 


Urine Urobilinogen    0.2


 


Ur Leukocyte Esterase    TRACE H


 


Urine RBC    0-2


 


Urine WBC    2-5


 


Ur Epithelial Cells    FEW


 


Urine Bacteria    OCCASIONAL


 


Blood Type   A NEGATIVE 


 


Antibody Screen   NEGATIVE 


 


Crossmatch   See Detail 














  07/31/17 08/02/17 08/02/17





  14:50 13:05 13:05


 


WBC   9.7  D 


 


RBC   4.31 


 


Hgb  10.8 L D  12.3 


 


Hct  32.4 L D  37.6  D 


 


MCV   87.2 


 


MCH   28.4 


 


MCHC Differential   32.6 


 


RDW   13.9 


 


Plt Count   528 H 


 


MPV   7.6 


 


Neutrophils %   73.9 


 


Band Neutrophils %   


 


Lymphocytes %   18.6 L 


 


Monocytes %   7.2 


 


Eosinophils %   0.3 


 


Basophils %   0.0 


 


Neutrophils (Manual)   


 


Lymphocytes   


 


Monocytes   


 


Metamyelocytes   


 


Platelet Estimate   


 


Platelet Morphology   


 


Poikilocytosis   


 


RBC Morph Micro Appear   


 


PT   


 


INR   


 


Sodium    137


 


Potassium    3.8


 


Chloride    106


 


Carbon Dioxide    24.9


 


Anion Gap    9.9


 


BUN    9


 


Creatinine    0.6


 


Est GFR ( Amer)    > 60.0


 


Est GFR (Non-Af Amer)    > 60.0


 


BUN/Creatinine Ratio    15.0


 


Glucose    133 H


 


Calcium    10.1


 


Total Bilirubin    0.3


 


AST    31


 


ALT    30


 


Alkaline Phosphatase    123 H


 


Total Protein    7.1


 


Albumin    3.3 L


 


Globulin    3.8


 


Albumin/Globulin Ratio    0.9 L


 


Urine Source   


 


Urine Color   


 


Urine Clarity   


 


Urine pH   


 


Ur Specific Gravity   


 


Urine Protein   


 


Urine Glucose (UA)   


 


Urine Ketones   


 


Urine Blood   


 


Urine Nitrate   


 


Urine Bilirubin   


 


Urine Urobilinogen   


 


Ur Leukocyte Esterase   


 


Urine RBC   


 


Urine WBC   


 


Ur Epithelial Cells   


 


Urine Bacteria   


 


Blood Type   


 


Antibody Screen   


 


Crossmatch   











Hospital Course: 





Brief HPI : 47 year old female who recently underwent a left hip replacement 2 

weeks prior to admission was seen at [facility] ER for left 

hip pain. Patient states that she awoke from bed yesterday with increase pain 

and discomfort to the left hip. The patient rated the pain to be +9/10 in 

severity. While in the ER and Xray of the left hip revealed  a dislocation of 

the left hip. She was subsequently admitted for evaluation and treatment.


Treatment: 





Patient during her hospital stay. Patient was seen and evaluated by Ortho and 

was underwent a closed reduction in the OR. Please see dictated ortho report. 

Patient was found to have hyponatremia and anemia which was corrected. Patient 

also has a history of opiod abuse along with psychiatric disorder. The patient 

was seen by psychiatry. Please see dictated report. Patient was subsequently 

transferred to a SNF in stable condition for further evaluation and treatment. 


Condition at Discharge: Stable


Disposition: Discharge/Transfered to SNF


Home Medications: 


Home Medication











 Medication  Instructions  Recorded  Type


 


Aspirin [Ecotrin] 81 mg PO DAILY 07/19/17 History


 


Carisoprodol [Soma] 250 mg PO HS 07/19/17 History


 


Gabapentin [Neurontin] 600 mg PO TID 07/19/17 History


 


QUEtiapine Fumarate [SEROquel] 300 mg PO HS 07/19/17 History


 


Ziprasidone HCl [Geodon] 60 mg PO DAILY 07/19/17 History


 


APAP/Codeine 300 mg/30 mg [Tylenol 1 tab PO Q4H PRN  tab 08/04/17 Rx





W/Codeine #3]   


 


Aspirin EC [Ecotrin] 81 mg PO DAILY  ect 08/04/17 Rx


 


Carisoprodol [Soma] 350 mg PO HS  tab 08/04/17 Rx


 


Cephalexin [Keflex] 250 mg PO QID  cap 08/04/17 Rx


 


Docusate Sodium [Colace] 100 mg PO DAILY  cap 08/04/17 Rx


 


Hydrocodone/APAP 10 mg/325 mg 1 tab PO Q6H PRN  tab 08/04/17 Rx





[Norco 10 mg/325 mg]   


 


Methadone Conc Oral Soln 80 mg PO DAILY  ud 08/04/17 Rx





[Methadose]   


 


Nicotine 14 mg/24 hr [Nicotine 14 mg TD DAILY  tdm 08/04/17 Rx





Transdermal System]   


 


alprazOLAM [Xanax*] 0.25 mg PO Q6HR PRN  tab 08/04/17 Rx











Consults and Follow-Up: 


RUPERTO SCHRADER [Other]


not on staff,PCP is [Primary Care Provider] - 


Instructions:  Hip Dislocation, Easy-to-Read

## 2017-09-28 ENCOUNTER — HOSPITAL ENCOUNTER (INPATIENT)
Dept: HOSPITAL 36 - ER | Age: 47
LOS: 1 days | Discharge: HOME | DRG: 351 | End: 2017-09-29
Attending: FAMILY MEDICINE | Admitting: FAMILY MEDICINE
Payer: MEDICAID

## 2017-09-28 DIAGNOSIS — G89.4: ICD-10-CM

## 2017-09-28 DIAGNOSIS — F41.9: ICD-10-CM

## 2017-09-28 DIAGNOSIS — F25.0: ICD-10-CM

## 2017-09-28 DIAGNOSIS — Z96.642: ICD-10-CM

## 2017-09-28 DIAGNOSIS — B19.20: ICD-10-CM

## 2017-09-28 DIAGNOSIS — F11.20: ICD-10-CM

## 2017-09-28 DIAGNOSIS — M79.672: Primary | ICD-10-CM

## 2017-09-28 LAB
ANION GAP SERPL CALC-SCNC: 9.5 MMOL/L (ref 7–16)
BASOPHILS NFR BLD AUTO: 0.8 % (ref 0–2)
BUN SERPL-MCNC: 9 MG/DL (ref 7–25)
BUN/CREAT SERPL: 10
CALCIUM SERPL-MCNC: 9.9 MG/DL (ref 8.6–10.3)
CHLORIDE SERPL-SCNC: 102 MEQ/L (ref 98–107)
CO2 SERPL-SCNC: 26.4 MEQ/L (ref 21–31)
CREAT SERPL-MCNC: 0.9 MG/DL (ref 0.6–1.2)
EOSINOPHIL NFR BLD AUTO: 3 % (ref 0–5)
ERYTHROCYTE [DISTWIDTH] IN BLOOD BY AUTOMATED COUNT: 15.2 % (ref 11.5–20)
GLUCOSE SERPL-MCNC: 113 MG/DL (ref 70–105)
HCT VFR BLD CALC: 39.4 % (ref 41–60)
HGB BLD-MCNC: 13.5 GM/DL (ref 12–16)
LYMPHOCYTES NFR BLD AUTO: 36.8 % (ref 20–50)
MCH RBC QN AUTO: 27.8 PG (ref 27–31)
MCHC RBC AUTO-ENTMCNC: 34.3 PG (ref 28–36)
MCV RBC AUTO: 81 FL (ref 81–100)
MONOCYTES NFR BLD AUTO: 8.7 % (ref 2–10)
NEUTROPHILS # BLD: 3.5 TH/CMM (ref 1.8–8)
NEUTROPHILS NFR BLD AUTO: 50.7 % (ref 40–80)
PLATELET # BLD: 299 TH/CMM (ref 150–400)
PMV BLD AUTO: 7.9 FL
POTASSIUM SERPL-SCNC: 3.9 MEQ/L (ref 3.5–5.1)
RBC # BLD AUTO: 4.86 MIL/CMM (ref 3.8–5.1)
SODIUM SERPL-SCNC: 134 MEQ/L (ref 136–145)
WBC # BLD AUTO: 6.9 TH/CMM (ref 4.8–10.8)

## 2017-09-28 PROCEDURE — Z7610: HCPCS

## 2017-09-28 NOTE — ED PHYSICIAN CHART
ED Chief Complaint/HPI





- Patient Information


Date Seen:: 09/28/17


Time Seen:: 15:25


Chief Complaint:: numbness left foot


History of Present Illness:: 





Patient complains of numbness left foot for about the last 1 week.  Patient had 

left hip replacement surgery end of July 2017 at this hospital.  9 days later 

her left hip prosthesis dislocated.  The hip dislocation was reduced at this 

hospital and the patient was placed on a PVC bar cast.


Allergies:: 


 Allergies











Allergy/AdvReac Type Severity Reaction Status Date / Time


 


No Known Allergies Allergy   Verified 07/19/17 13:12











Historian:: Patient


Review:: Nurse's Note Reviewed





ED Review of Systems





- Review of Systems


General/Constitutional: No fever, No chills


Skin: No skin lesions


Head: No headache


Eyes: No loss of vision


ENT: No earache


Neck: No neck pain, No swelling


Cardio Vascular: No chest pain


Pulmonary: No SOB


GI: No nausea, No vomiting


G/U: No dysuria


Musculoskeletal: Other (see history)


Endocrine: No polyuria, No polydipsia


Psychiatric: Prior psych history


Hematopoietic: No bruising


Allergic/Immuno: No urticaria


Neurological: No syncope





ED Past Medical History





- Past Medical History


Past Medical History: Other (history of opioid addiction; bipolar schizophrenia

; chronic pain; anxiety; hepatitis C)


Family History: Diabetes Melitus, HTN


Social History: Smoker, , Care Facility


Surgical History: other (see history and physical; pins placed first metatarsal 

right foot)


Psychiatricy History: Schizophrenia, Bipolar


Medication: Reviewed





Family Medical History





- Family Member


  ** Mother


History Unknown: Yes


Hx Family Cancer: No


Hx Family Congestive Heart Failure: No


Hx Family Hypertension: No


Hx Family Diabetes: No


Hx Family Seizures: No


Hx Family Dementia: No


Hx Family AIDS: No





ED Physical Exam





- Physical Examination


General/Constitutional: Well-developed, well-nourished, Alert, No distress


Head: Atraumatic


Eyes: Lids, conjuctiva normal, PERRL


Other Skin comments:: 


Left foot warm with capillary refill of about 3 seconds.  Strong dorsalis pedis 

pulse left foot.


ENMT: External ears, nose nl, Lips, teeth, gums nl


Neck: No nuchal rigidity


Respiratory: Nl effort/Exclusion, Clear to Auscultation, No Wheeze/Rhonchi/Rales


Cardio Vascular: RRR


GI: No tenderness/rebounding/guarding, No organomegaly, No hernia


: No CVA tenderness


Other Extremities comments:: 





Left leg long leg cast; right leg: right thigh cast; horizontal plastic tube 

connecting the right thigh cast with the left thigh aspect of the left long leg 

cast


Neuro/Psych: Alert/oriented


Misc: Normal back, No paraspinal tenderness





ED Septic Shock





- .


Is Septic Shock (SBP<90, OR Lactate>4 mmol\L) present?: No





ED Reassessment (Disposition)





- Reassessment


Reassessment Condition:: Unchanged





- Diagnosis


Diagnosis:: 


Status post dislocation left hip prosthesis; left foot numbness





- Patient Disposition


Admitted to:: Med/Surg


Spoke to:: Reynaldo Ho


Admitting Medical Physician:: Reynaldo Ho


Condition at Disposition:: Stable, Unchanged

## 2017-09-29 VITALS — DIASTOLIC BLOOD PRESSURE: 76 MMHG | SYSTOLIC BLOOD PRESSURE: 112 MMHG

## 2017-09-29 LAB
ALBUMIN/GLOB SERPL: 1.2 {RATIO} (ref 1–1.8)
ALP SERPL-CCNC: 147 U/L (ref 34–104)
ALT SERPL-CCNC: 24 U/L (ref 7–52)
ANION GAP SERPL CALC-SCNC: 8.1 MMOL/L (ref 7–16)
AST SERPL-CCNC: 29 U/L (ref 13–39)
BASOPHILS NFR BLD AUTO: 0.3 % (ref 0–2)
BILIRUB SERPL-MCNC: 0.2 MG/DL (ref 0.3–1)
BUN SERPL-MCNC: 12 MG/DL (ref 7–25)
BUN/CREAT SERPL: 15
CALCIUM SERPL-MCNC: 10.1 MG/DL (ref 8.6–10.3)
CHLORIDE SERPL-SCNC: 104 MEQ/L (ref 98–107)
CO2 SERPL-SCNC: 27.9 MEQ/L (ref 21–31)
CREAT SERPL-MCNC: 0.8 MG/DL (ref 0.6–1.2)
EOSINOPHIL NFR BLD AUTO: 2.9 % (ref 0–5)
ERYTHROCYTE [DISTWIDTH] IN BLOOD BY AUTOMATED COUNT: 15.6 % (ref 11.5–20)
GLOBULIN SER-MCNC: 3.4 GM/DL
GLUCOSE SERPL-MCNC: 113 MG/DL (ref 70–105)
HCT VFR BLD CALC: 40 % (ref 41–60)
HGB BLD-MCNC: 13.5 GM/DL (ref 12–16)
INR PPP: 0.89 (ref 0.5–1.4)
LYMPHOCYTES NFR BLD AUTO: 36.6 % (ref 20–50)
MCH RBC QN AUTO: 27.7 PG (ref 27–31)
MCHC RBC AUTO-ENTMCNC: 33.7 PG (ref 28–36)
MCV RBC AUTO: 82.3 FL (ref 81–100)
MONOCYTES NFR BLD AUTO: 10.4 % (ref 2–10)
NEUTROPHILS # BLD: 3 TH/CMM (ref 1.8–8)
NEUTROPHILS NFR BLD AUTO: 49.8 % (ref 40–80)
PLATELET # BLD: 282 TH/CMM (ref 150–400)
PMV BLD AUTO: 7.8 FL
POTASSIUM SERPL-SCNC: 4 MEQ/L (ref 3.5–5.1)
PROTHROMBIN TIME: 9.2 SECONDS (ref 9.5–11.5)
RBC # BLD AUTO: 4.87 MIL/CMM (ref 3.8–5.1)
SODIUM SERPL-SCNC: 136 MEQ/L (ref 136–145)
WBC # BLD AUTO: 6 TH/CMM (ref 4.8–10.8)

## 2017-09-29 NOTE — HISTORY AND PHYSICAL
History of Present Illness





- HPI


Chief Complaint: 





left foot numbness


HPI: 





47 year old female who presents to [facility] ER for 

complaints of left foot numbness. Patient is s/p left hip replacement in July 2017. Approximately 9 days later she underwent left hip prosthesis dislocation. 

Patient underwent reduction of her hip at that time and was placed on a PVC bar 

cast. Patient was subsequently admitted for further evaluation and treatment. 


Vital Signs: 





 Last Vital Signs











Temp  97.4 F   09/29/17 04:00


 


Pulse  96   09/29/17 04:00


 


Resp  20   09/29/17 04:00


 


BP  124/95   09/29/17 04:00


 


Pulse Ox  96   09/29/17 04:00














Past Medical History


Cardiovascular: Report: No Pertinent Hx


Pulmonary: Report: No Pertinent Hx


CNS: Report: No Pertinent Hx


GI: Report: No Pertinent Hx


Psych: Report: Anxiety, Bipolar, Schizophrenia, Other (Chronic Pain Syndrome)


Musculoskeletal: Report: Other (s/p hip surgery July 2017)


Infectious Disease: Report: Other (hepatitis C)


Renal/: Report: No Pertinent Hx


Endocrine: Report: No Pertinent Hx


Dermatology: Report: No Pertinent Hx





- Past Surgical History


Past Surgical History: Total Hip Replacement





Family Medical History





- Family Member


  ** Mother


History Unknown: Yes


Hx Family Cancer: No


Hx Family Congestive Heart Failure: No


Hx Family Hypertension: No


Hx Family Diabetes: Yes


Hx Family Seizures: No


Hx Family Dementia: No


Hx Family AIDS: No





Social History


Smoke: No


Alcohol: None


Drugs: None


Lives: Nursing Home





- Medications


Home Medications: 


Home Medication











 Medication  Instructions  Recorded  Type


 


Aspirin [Ecotrin] 81 mg PO DAILY 07/19/17 History


 


Carisoprodol [Soma] 250 mg PO HS 07/19/17 History


 


Gabapentin [Neurontin] 600 mg PO TID 07/19/17 History


 


QUEtiapine Fumarate [SEROquel] 300 mg PO HS 07/19/17 History


 


Ziprasidone HCl [Geodon] 60 mg PO DAILY 07/19/17 History


 


APAP/Codeine 300 mg/30 mg [Tylenol 1 tab PO Q4H PRN  tab 08/04/17 Rx





W/Codeine #3]   


 


Aspirin EC [Ecotrin] 81 mg PO DAILY  ect 08/04/17 Rx


 


Carisoprodol [Soma] 350 mg PO HS  tab 08/04/17 Rx


 


Cephalexin [Keflex] 250 mg PO QID  cap 08/04/17 Rx


 


Docusate Sodium [Colace] 100 mg PO DAILY  cap 08/04/17 Rx


 


Hydrocodone/APAP 10 mg/325 mg 1 tab PO Q6H PRN  tab 08/04/17 Rx





[Norco 10 mg/325 mg]   


 


Methadone Conc Oral Soln 80 mg PO DAILY  ud 08/04/17 Rx





[Methadose]   


 


Nicotine 14 mg/24 hr [Nicotine 14 mg TD DAILY  tdm 08/04/17 Rx





Transdermal System]   


 


alprazOLAM [Xanax*] 0.25 mg PO Q6HR PRN  tab 08/04/17 Rx














- Allergies


Allergies/Adverse Reactions: 


 Allergies











Allergy/AdvReac Type Severity Reaction Status Date / Time


 


No Known Allergies Allergy   Verified 09/28/17 16:06














Review of Systems





- Review of Systems


Constitutional: Report: No Significant


Eyes: Report: No Significant


ENT: Report: No Significant


Respiratory: Report: No Significant


Cardiovascular: Report: No Significant


Gastrointestinal: Report: No Significant


Genitourinary: Report: No Significant


Musculoskeletal: Report: Foot Pain


Skin: Report: No Significant


Neurological: Report: No Significant





Physical Exam





- Physical Exam


HEENT: Report: Ears Nose Throat within normal limits, Pharnyx within normal 

limits


Neck: Report: Within normal limits


Cardiovascular Systems: Report: +s1/s2 noted, Regular, Rate and Rhythm


Respiratory: Report: Breath Sounds are within normal limits, Clear to 

Auscultation of lung fields


Abdomen: Report: Non-tender to palpation


Extremities: Report: Non-tender to palpation.


Skin: Report: Color of skin is within normal limits


Neuro/Psych: Report: Mood affect is within normal limits, A+Ox3





- Lab Results


All Lab Results last 24 hours: 





 Laboratory Last Values











WBC  6.9 Th/cmm (4.8-10.8)  D 09/28/17  15:55    


 


RBC  4.86 Mil/cmm (3.80-5.10)   09/28/17  15:55    


 


Hgb  13.5 gm/dL (12-16)   09/28/17  15:55    


 


Hct  39.4 % (41.0-60)  L  09/28/17  15:55    


 


MCV  81.0 fl ()   09/28/17  15:55    


 


MCH  27.8 pg (27.0-31.0)   09/28/17  15:55    


 


MCHC Differential  34.3 pg (28.0-36.0)   09/28/17  15:55    


 


RDW  15.2 % (11.5-20.0)   09/28/17  15:55    


 


Plt Count  299 Th/cmm (150-400)  D 09/28/17  15:55    


 


MPV  7.9 fl  09/28/17  15:55    


 


Neutrophils %  50.7 % (40.0-80.0)   09/28/17  15:55    


 


Lymphocytes %  36.8 % (20.0-50.0)   09/28/17  15:55    


 


Monocytes %  8.7 % (2.0-10.0)   09/28/17  15:55    


 


Eosinophils %  3.0 % (0.0-5.0)   09/28/17  15:55    


 


Basophils %  0.8 % (0.0-2.0)   09/28/17  15:55    


 


Sodium  136 mEq/L (136-145)   09/29/17  05:49    


 


Potassium  4.0 mEq/L (3.5-5.1)   09/29/17  05:49    


 


Chloride  104 mEq/L ()   09/29/17  05:49    


 


Carbon Dioxide  27.9 mEq/L (21.0-31.0)   09/29/17  05:49    


 


Anion Gap  8.1  (7.0-16.0)   09/29/17  05:49    


 


BUN  12 mg/dL (7-25)   09/29/17  05:49    


 


Creatinine  0.8 mg/dL (0.6-1.2)   09/29/17  05:49    


 


Est GFR ( Amer)  > 60.0 ml/min (>90)   09/29/17  05:49    


 


Est GFR (Non-Af Amer)  > 60.0 ml/min  09/29/17  05:49    


 


BUN/Creatinine Ratio  15.0   09/29/17  05:49    


 


Glucose  113 mg/dL ()  H  09/29/17  05:49    


 


Calcium  10.1 mg/dL (8.6-10.3)   09/29/17  05:49    


 


Total Bilirubin  0.2 mg/dL (0.3-1.0)  L  09/29/17  05:49    


 


AST  29 U/L (13-39)   09/29/17  05:49    


 


ALT  24 U/L (7-52)   09/29/17  05:49    


 


Alkaline Phosphatase  147 U/L ()  H  09/29/17  05:49    


 


Total Protein  7.3 gm/dL (6.0-8.3)   09/29/17  05:49    


 


Albumin  3.9 gm/dL (3.7-5.3)   09/29/17  05:49    


 


Globulin  3.4 gm/dL  09/29/17  05:49    


 


Albumin/Globulin Ratio  1.2  (1.0-1.8)   09/29/17  05:49    








 Laboratory Results - last 24 hr











  09/29/17





  05:49


 


Sodium  136


 


Potassium  4.0


 


Chloride  104


 


Carbon Dioxide  27.9


 


Anion Gap  8.1


 


BUN  12


 


Creatinine  0.8


 


Est GFR ( Amer)  > 60.0


 


Est GFR (Non-Af Amer)  > 60.0


 


BUN/Creatinine Ratio  15.0


 


Glucose  113 H


 


Calcium  10.1


 


Total Bilirubin  0.2 L


 


AST  29


 


ALT  24


 


Alkaline Phosphatase  147 H


 


Total Protein  7.3


 


Albumin  3.9


 


Globulin  3.4


 


Albumin/Globulin Ratio  1.2














- Assessment


Assessment: 





left foot pain/numbness


s/p total hip replacement


opioid addiction


bipolar disorder


schizophrenia


chronic pain syndrome


hepatitis C


anxiety disorder





- Plan


Plan: 





will obtain orthopedic consult


will obtain psychiatric consult

## 2017-09-29 NOTE — CONSULTATION
DATE OF CONSULTATION:  09/29/2017



AGE:  47.



SEX:  Female.



PHYSICIAN:  Dr. Ho.



CONSULTANT:  Dr. Manrique.



REASON FOR THE CONSULT:  Monitor psychotropic medications.



HISTORY OF PRESENT ILLNESS:  The patient is a 47-year-old female with history of

what seems to be schizoaffective disorder.  The patient had left hip replacement

and was admitted with numbness of left foot.  The patient has been taking

gabapentin, Seroquel, and Geodon.  The patient currently seems to be sedated. 

The patient also according to staff was slightly demanding earlier.  She is

exhibiting no major problems so far ____.



PAST PSYCHIATRIC HISTORY:  The patient is taking multiple psychotropic

medications for what seems to be schizoaffective disorder.



PAST MEDICAL HISTORY:  Left hip replacement with left numbness.



SOCIAL HISTORY:  No known alcohol or drug use.



PRIMARY DIAGNOSIS:  Schizoaffective disorder, bipolar type, with psychosis.



TREATMENT PLAN:  We will continue current psychotropic medications.  We will

monitor her behavior and reassess when more alert.  It is my opinion that the

patient is on high dose of psychotropic medications.



Thanks to Dr. Ho and will follow up with you.





DD: 09/29/2017 07:36

DT: 09/29/2017 10:57

[facility]# [id number]  [id number]

## 2017-09-30 NOTE — DIAGNOSTIC IMAGING REPORT
Some: Hip joints bilaterally.



HISTORY: Status post cast removal.



Findings:



Portable supine examination of the hip joints bilaterally was performed

at 1700 hours.



The study demonstrates satisfactory positioning of total left hip

prosthesis. There is no evidence of fracture dislocation. The head of

prosthesis is well within the acetabular fossa



The right hip joint is intact. The pelvis is normal.



IMPRESSION:

1. Satisfactory positioning of total left hip prosthesis.



Unremarkable sedation right hip joint.

## 2017-10-03 NOTE — DISCHARGE SUMMARY
DATE OF DISCHARGE:  09/30/2017



PRELIMINARY DIAGNOSES:

1.  Left foot pain with numbness.

2.  Status post total hip replacement.

3.  History of opioid addiction.

4.  Bipolar disorder.

5.  Schizophrenia.

6.  Chronic pain syndrome.

7.  Hepatitis C.

8.  Anxiety disorder.



DISCHARGE DIAGNOSES:

1.  Left foot pain with numbness.

2.  Status post total hip replacement.

3.  History of opioid addiction.

4.  Bipolar disorder.

5.  Schizophrenia.

6.  Chronic pain syndrome.

7.  Hepatitis C.

8.  Anxiety disorder.



BRIEF HISTORY OF PRESENT ILLNESS:  This is a 47-year-old female who presents to

[facility] ER for complaints of left foot pain and numbness. 

The patient was seen at the hospital in 07/2017, initially underwent left hip

replacement.  Approximately, 9 days afterwards, her prosthesis to her left hip

was displaced and underwent reduction of the left hip under Ortho.  The patient

was placed on a PVC _____ cast and was transferred back to the nursing home. 

The patient was subsequently transferred to ER for left foot numbness and pain. 

Her initial lab work in the ER was essentially normal.



Her CBC shows white count was normal at 6.9, hemoglobin 13.5, hematocrit 39.4,

and platelets 299,000.  Chem-7:  Sodium was 136, potassium 4.0, chloride 104,

bicarbonate 27.9, BUN 12, creatinine 0.8, and glucose was 113.  The patient was

subsequently admitted for further evaluation and treatment.



HOSPITAL COURSE:  The patient improved during her hospital stay, was seen and

evaluated by Ortho.  The patient's PVC _____ cast was replaced and was given a

new cast.  She was subsequently transferred back to the nursing home to continue

her current treatment.





DD: 10/03/2017 16:27

DT: 10/03/2017 19:39

JOB# [id number]  [id number]

## 2018-06-11 ENCOUNTER — HOSPITAL ENCOUNTER (EMERGENCY)
Dept: HOSPITAL 36 - ER | Age: 48
Discharge: HOME | End: 2018-06-11
Payer: MEDICAID

## 2018-06-11 DIAGNOSIS — Z96.642: ICD-10-CM

## 2018-06-11 DIAGNOSIS — M25.552: Primary | ICD-10-CM

## 2018-06-11 DIAGNOSIS — F17.200: ICD-10-CM

## 2018-06-11 PROCEDURE — Z7502: HCPCS

## 2018-06-11 NOTE — ED PHYSICIAN CHART
ED Chief Complaint/HPI





- Patient Information


Date Seen:: 06/11/18


Time Seen:: 20:30


Chief Complaint:: left hip pain


History of Present Illness:: 





At 10:30-11:00 last night patient developed left buttocks pain radiating down 

her left leg.  No recent trauma.  Patient had left hip replacement surgery here 

07/19/2017.  2 days after she was discharged from the hospital her left hip 

prosthesis dislocated and had to be reduced


Allergies:: 


 Allergies











Allergy/AdvReac Type Severity Reaction Status Date / Time


 


No Known Allergies Allergy   Verified 06/11/18 17:37











Vitals:: 


 Vital Signs - 8 hr











  06/11/18





  17:37


 


Temp 97.0 F


 


HR 99


 


RR 16


 


/98


 


O2 Sat % 98











Historian:: Patient


Review:: Nurse's Note Reviewed





ED Review of Systems





- Review of Systems


General/Constitutional: No fever, No chills, No weight loss, No weakness, No 

diaphoresis, No edema, No loss of appetite


Skin: No skin lesions, No rash, No bruising


Head: No headache, No light-headedness


Eyes: No loss of vision, No pain, No diplopia


ENT: No earache, No nasal drainage, No sore throat, No tinnitus


Neck: No neck pain, No swelling, No thyromegaly, No stiffness, No mass noted


Cardio Vascular: No chest pain, No palpitations, No PND, No orthopnea, No edema


Pulmonary: No SOB, No cough, No sputum, No wheezing


GI: No nausea, No vomiting, No diarrhea, No pain, No melena, No hematochezia, 

No constipation, No hematemesis


G/U: No dysuria, No frequency, No hematuria


Musculoskeletal: Bone or joint pain, Muscle pain


Endocrine: No polyuria, No polydipsia


Psychiatric: Prior psych history, Anxiety, No suicidal ideation


Hematopoietic: No bruising, No lymphadenopathy


Allergic/Immuno: No urticaria, No angioedema


Neurological: No syncope, No focal symptoms, No weakness, No paresthesia, No 

headache, No seizure, No dizziness, No confusion, No vertigo





ED Past Medical History





- Past Medical History


Past Medical History: Other (bipolar disorder with psychotic features; 

schizoaffective disorder; anxiety; panic attacks)


Family History: HTN


Social History: Smoker, No Alcohol, Other (patient states she smokes as many 

cigarettes as she possibly can every day)


Surgical History: other (left hip prosthesis)


Psychiatricy History: Other (see above)





Family Medical History





- Family Member


  ** Mother


History Unknown: Yes


Hx Family Cancer: No


Hx Family Congestive Heart Failure: No


Hx Family Hypertension: No


Hx Family Diabetes: Yes


Hx Family Seizures: No


Hx Family Dementia: No


Hx Family AIDS: No





ED Physical Exam





- Physical Examination


General/Constitutional: Awake, Well-developed, well-nourished, Alert, No 

distress, GCS 15, Non-toxic appearing, Ambulatory


Head: Atraumatic


Eyes: Lids, conjuctiva normal, PERRL, EOMI


Skin: Nl inspection, No rash, No skin lesions, No ecchymosis, Well hydrated, No 

lymphadenopathy


ENMT: External ears, nose nl, Nasal exam nl, Lips, teeth, gums nl


Neck: Nontender, Full ROM w/o pain, No JVD, No nuchal rigidity, No bruit, No 

mass, No stridor


Respiratory: Nl effort/Exclusion, Clear to Auscultation, No Wheeze/Rhonchi/Rales


Cardio Vascular: RRR, No murmur, gallop, rubs, NL S1 S2


GI: No tenderness/rebounding/guarding, No organomegaly, No hernia, Normal BS's, 

Nondistended, No mass/bruits, No McBurney tenderness


: No CVA tenderness


Extremities: No edema, Normal digits & nails


Other Extremities comments:: 


Walks well but with slight limp favoring the left leg


Neuro/Psych: Alert/oriented, DTR's symmetric, Normal sensory exam, Normal motor 

strength, Judgement/insight normal, Mood normal, Normal gait, No focal deficits


Misc: Normal back, No paraspinal tenderness





ED Labs/Radiology/EKG Results





- Radiology Results


Results: 


Left hip x-ray showed left hip prosthesis in place





ED Septic Shock





- .


Is Septic Shock (SBP<90, OR Lactate>4 mmol\L) present?: No





- <6hrs of presentation:


Vital Signs: 


 Vital Signs - 8 hr











  06/11/18





  17:37


 


Temp 97.0 F


 


HR 99


 


RR 16


 


/98


 


O2 Sat % 98














ED Reassessment (Disposition)





- Diagnosis


Diagnosis:: 





Left hip pain; status post left hip prosthesis; bipolar disorder with psychotic 

features; nicotine abuse





- Aftercare/Follow up Instructions


Aftercare/Follow-Up Instructions:: Refer to Discharge Instructions





- Patient Disposition


Discharge/Transfer:: Home


Condition at Disposition:: Stable, Unchanged





ED Discharge Plan





- Patient Disposition


Instructions:  Hip Pain


Additional Instructions: 


FOLLOW UP WITH YOUR ORTHOPEDIC DR. IF NOT FEELING ANY BETTER. CONTROL ANY PAIN 

WITH TYLENOL OR MOTRIN AS DIRECTED.

## 2018-06-12 NOTE — DIAGNOSTIC IMAGING REPORT
Left hip 2 views



Indication: pain



Comparison: Left hip x-ray on 9/29/2017



Findings: A bipolar left hip hemiarthroplasty seen.  Mild increased

surrounding heterotopic ossification is seen laterally.  No evidence of

acute fracture or dislocation.  No definite evidence of hardware

loosening.  Nutrient vessels of the femur are noted.



Impression:



Redemonstration of bipolar left hip hemiarthroplasty.  No evidence of an

acute fracture or hardware loosening.



Mild increased heterotopic calcification seen along the lateral aspect

of left acetabulum.



In the setting of trauma, if clinical symptoms persist and there is

continued concern for an occult fracture, follow up exams in 5-7 days is

suggested.

## 2018-06-20 ENCOUNTER — HOSPITAL ENCOUNTER (EMERGENCY)
Dept: HOSPITAL 26 - MED | Age: 48
Discharge: HOME | End: 2018-06-20
Payer: MEDICAID

## 2018-06-20 VITALS — SYSTOLIC BLOOD PRESSURE: 132 MMHG | DIASTOLIC BLOOD PRESSURE: 96 MMHG

## 2018-06-20 VITALS — HEIGHT: 63 IN | WEIGHT: 171 LBS | BODY MASS INDEX: 30.3 KG/M2

## 2018-06-20 DIAGNOSIS — M79.89: ICD-10-CM

## 2018-06-20 DIAGNOSIS — L03.115: Primary | ICD-10-CM

## 2018-06-20 LAB
ALBUMIN FLD-MCNC: 3.5 G/DL (ref 3.4–5)
ANION GAP SERPL CALCULATED.3IONS-SCNC: 11.2 MMOL/L (ref 8–16)
AST SERPL-CCNC: 67 U/L (ref 15–37)
BASOPHILS # BLD AUTO: 0.1 K/UL (ref 0–0.22)
BASOPHILS NFR BLD AUTO: 1.2 % (ref 0–2)
BILIRUB SERPL-MCNC: 0.3 MG/DL (ref 0–1)
BUN SERPL-MCNC: 11 MG/DL (ref 7–18)
CHLORIDE SERPL-SCNC: 102 MMOL/L (ref 98–107)
CO2 SERPL-SCNC: 31.2 MMOL/L (ref 21–32)
CREAT SERPL-MCNC: 1.1 MG/DL (ref 0.6–1.3)
EOSINOPHIL # BLD AUTO: 0.1 K/UL (ref 0–0.4)
EOSINOPHIL NFR BLD AUTO: 0.7 % (ref 0–4)
ERYTHROCYTE [DISTWIDTH] IN BLOOD BY AUTOMATED COUNT: 14.4 % (ref 11.6–13.7)
GFR SERPL CREATININE-BSD FRML MDRD: 68 ML/MIN (ref 90–?)
GLUCOSE SERPL-MCNC: 90 MG/DL (ref 74–106)
HCT VFR BLD AUTO: 40.8 % (ref 36–48)
HGB BLD-MCNC: 13.8 G/DL (ref 12–16)
LIPASE SERPL-CCNC: 98 U/L (ref 73–393)
LYMPHOCYTES # BLD AUTO: 1.8 K/UL (ref 2.5–16.5)
LYMPHOCYTES NFR BLD AUTO: 20.4 % (ref 20.5–51.1)
MCH RBC QN AUTO: 29 PG (ref 27–31)
MCHC RBC AUTO-ENTMCNC: 34 G/DL (ref 33–37)
MCV RBC AUTO: 86.1 FL (ref 80–94)
MONOCYTES # BLD AUTO: 0.8 K/UL (ref 0.8–1)
MONOCYTES NFR BLD AUTO: 8.5 % (ref 1.7–9.3)
NEUTROPHILS # BLD AUTO: 6.2 K/UL (ref 1.8–7.7)
NEUTROPHILS NFR BLD AUTO: 69.2 % (ref 42.2–75.2)
PLATELET # BLD AUTO: 226 K/UL (ref 140–450)
POTASSIUM SERPL-SCNC: 4.4 MMOL/L (ref 3.5–5.1)
RBC # BLD AUTO: 4.74 MIL/UL (ref 4.2–5.4)
SODIUM SERPL-SCNC: 140 MMOL/L (ref 136–145)
WBC # BLD AUTO: 8.9 K/UL (ref 4.8–10.8)

## 2018-06-20 PROCEDURE — 85025 COMPLETE CBC W/AUTO DIFF WBC: CPT

## 2018-06-20 PROCEDURE — 93971 EXTREMITY STUDY: CPT

## 2018-06-20 PROCEDURE — 36415 COLL VENOUS BLD VENIPUNCTURE: CPT

## 2018-06-20 PROCEDURE — 99285 EMERGENCY DEPT VISIT HI MDM: CPT

## 2018-06-20 PROCEDURE — 83690 ASSAY OF LIPASE: CPT

## 2018-06-20 PROCEDURE — 80053 COMPREHEN METABOLIC PANEL: CPT

## 2018-12-30 ENCOUNTER — HOSPITAL ENCOUNTER (EMERGENCY)
Dept: HOSPITAL 36 - ER | Age: 48
Discharge: HOME | End: 2018-12-30
Payer: MEDICAID

## 2018-12-30 DIAGNOSIS — F20.9: ICD-10-CM

## 2018-12-30 DIAGNOSIS — F13.90: Primary | ICD-10-CM

## 2018-12-30 DIAGNOSIS — Z72.89: ICD-10-CM

## 2018-12-30 DIAGNOSIS — G89.29: ICD-10-CM

## 2018-12-30 DIAGNOSIS — F31.9: ICD-10-CM

## 2018-12-30 DIAGNOSIS — F19.90: ICD-10-CM

## 2018-12-30 LAB
AMPHET UR-MCNC: POSITIVE NG/ML
BARBITURATES UR-MCNC: NEGATIVE UG/ML
BENZODIAZEPINES PNL UR: POSITIVE
CANNABINOIDS SERPL QL CFM: NEGATIVE
COCAINE METAB.OTHER UR-MCNC: NEGATIVE NG/ML
METHADONE UR CFM-MCNC: NEGATIVE NG/ML
METHAMPHET UR QL: POSITIVE
OPIATES UR QL: NEGATIVE
PCP UR-MCNC: NEGATIVE UG/L
TRICYCLICS UR QL: POSITIVE

## 2018-12-30 PROCEDURE — Z7502: HCPCS
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
